# Patient Record
Sex: MALE | Race: BLACK OR AFRICAN AMERICAN | NOT HISPANIC OR LATINO
[De-identification: names, ages, dates, MRNs, and addresses within clinical notes are randomized per-mention and may not be internally consistent; named-entity substitution may affect disease eponyms.]

---

## 2016-12-27 RX ORDER — GABAPENTIN 400 MG/1
100 CAPSULE ORAL ONCE
Qty: 0 | Refills: 0 | Status: COMPLETED | OUTPATIENT
Start: 2017-01-07 | End: 2017-01-07

## 2016-12-27 RX ORDER — VANCOMYCIN HCL 1 G
1500 VIAL (EA) INTRAVENOUS ONCE
Qty: 0 | Refills: 0 | Status: DISCONTINUED | OUTPATIENT
Start: 2017-01-07 | End: 2017-01-07

## 2016-12-27 RX ORDER — SODIUM CHLORIDE 9 MG/ML
3 INJECTION INTRAMUSCULAR; INTRAVENOUS; SUBCUTANEOUS EVERY 8 HOURS
Qty: 0 | Refills: 0 | Status: DISCONTINUED | OUTPATIENT
Start: 2017-01-07 | End: 2017-01-09

## 2016-12-27 RX ORDER — ACETAMINOPHEN 500 MG
975 TABLET ORAL ONCE
Qty: 0 | Refills: 0 | Status: COMPLETED | OUTPATIENT
Start: 2017-01-07 | End: 2017-01-07

## 2016-12-27 RX ORDER — TRAMADOL HYDROCHLORIDE 50 MG/1
50 TABLET ORAL ONCE
Qty: 0 | Refills: 0 | Status: DISCONTINUED | OUTPATIENT
Start: 2017-01-07 | End: 2017-01-07

## 2016-12-27 RX ORDER — PANTOPRAZOLE SODIUM 20 MG/1
40 TABLET, DELAYED RELEASE ORAL ONCE
Qty: 0 | Refills: 0 | Status: COMPLETED | OUTPATIENT
Start: 2017-01-07 | End: 2017-01-07

## 2017-01-06 ENCOUNTER — RESULT REVIEW (OUTPATIENT)
Age: 72
End: 2017-01-06

## 2017-01-07 ENCOUNTER — INPATIENT (INPATIENT)
Facility: HOSPITAL | Age: 72
LOS: 1 days | Discharge: HOME | DRG: 470 | End: 2017-01-09
Attending: ORTHOPAEDIC SURGERY | Admitting: ORTHOPAEDIC SURGERY
Payer: MEDICARE

## 2017-01-07 ENCOUNTER — APPOINTMENT (OUTPATIENT)
Dept: ORTHOPEDIC SURGERY | Facility: HOSPITAL | Age: 72
End: 2017-01-07

## 2017-01-07 VITALS
WEIGHT: 207.9 LBS | OXYGEN SATURATION: 99 % | HEART RATE: 60 BPM | DIASTOLIC BLOOD PRESSURE: 64 MMHG | HEIGHT: 68 IN | SYSTOLIC BLOOD PRESSURE: 132 MMHG | TEMPERATURE: 99 F | RESPIRATION RATE: 18 BRPM

## 2017-01-07 DIAGNOSIS — Z90.79 ACQUIRED ABSENCE OF OTHER GENITAL ORGAN(S): Chronic | ICD-10-CM

## 2017-01-07 DIAGNOSIS — Z96.641 PRESENCE OF RIGHT ARTIFICIAL HIP JOINT: Chronic | ICD-10-CM

## 2017-01-07 DIAGNOSIS — M16.12 UNILATERAL PRIMARY OSTEOARTHRITIS, LEFT HIP: ICD-10-CM

## 2017-01-07 DIAGNOSIS — Z98.49 CATARACT EXTRACTION STATUS, UNSPECIFIED EYE: Chronic | ICD-10-CM

## 2017-01-07 LAB
ANION GAP SERPL CALC-SCNC: 12 MMOL/L — SIGNIFICANT CHANGE UP (ref 5–17)
BUN SERPL-MCNC: 24 MG/DL — HIGH (ref 7–23)
CALCIUM SERPL-MCNC: 9.5 MG/DL — SIGNIFICANT CHANGE UP (ref 8.4–10.5)
CHLORIDE SERPL-SCNC: 105 MMOL/L — SIGNIFICANT CHANGE UP (ref 96–108)
CO2 SERPL-SCNC: 22 MMOL/L — SIGNIFICANT CHANGE UP (ref 22–31)
CREAT SERPL-MCNC: 1.38 MG/DL — HIGH (ref 0.5–1.3)
GLUCOSE SERPL-MCNC: 113 MG/DL — HIGH (ref 70–99)
HCT VFR BLD CALC: 38.6 % — LOW (ref 39–50)
HGB BLD-MCNC: 12.9 G/DL — LOW (ref 13–17)
MCHC RBC-ENTMCNC: 29.1 PG — SIGNIFICANT CHANGE UP (ref 27–34)
MCHC RBC-ENTMCNC: 33.6 GM/DL — SIGNIFICANT CHANGE UP (ref 32–36)
MCV RBC AUTO: 86.6 FL — SIGNIFICANT CHANGE UP (ref 80–100)
PLATELET # BLD AUTO: 265 K/UL — SIGNIFICANT CHANGE UP (ref 150–400)
POTASSIUM SERPL-MCNC: 4.9 MMOL/L — SIGNIFICANT CHANGE UP (ref 3.5–5.3)
POTASSIUM SERPL-SCNC: 4.9 MMOL/L — SIGNIFICANT CHANGE UP (ref 3.5–5.3)
RBC # BLD: 4.46 M/UL — SIGNIFICANT CHANGE UP (ref 4.2–5.8)
RBC # FLD: 15.1 % — HIGH (ref 10.3–14.5)
SODIUM SERPL-SCNC: 139 MMOL/L — SIGNIFICANT CHANGE UP (ref 135–145)
WBC # BLD: 10.1 K/UL — SIGNIFICANT CHANGE UP (ref 3.8–10.5)
WBC # FLD AUTO: 10.1 K/UL — SIGNIFICANT CHANGE UP (ref 3.8–10.5)

## 2017-01-07 PROCEDURE — 88305 TISSUE EXAM BY PATHOLOGIST: CPT | Mod: 26

## 2017-01-07 PROCEDURE — 72170 X-RAY EXAM OF PELVIS: CPT | Mod: 26

## 2017-01-07 PROCEDURE — 27130 TOTAL HIP ARTHROPLASTY: CPT | Mod: LT

## 2017-01-07 PROCEDURE — 27130 TOTAL HIP ARTHROPLASTY: CPT | Mod: AS,LT

## 2017-01-07 PROCEDURE — 88311 DECALCIFY TISSUE: CPT | Mod: 26

## 2017-01-07 RX ORDER — OXYCODONE HYDROCHLORIDE 5 MG/1
10 TABLET ORAL EVERY 4 HOURS
Qty: 0 | Refills: 0 | Status: DISCONTINUED | OUTPATIENT
Start: 2017-01-07 | End: 2017-01-09

## 2017-01-07 RX ORDER — PANTOPRAZOLE SODIUM 20 MG/1
40 TABLET, DELAYED RELEASE ORAL DAILY
Qty: 0 | Refills: 0 | Status: DISCONTINUED | OUTPATIENT
Start: 2017-01-07 | End: 2017-01-09

## 2017-01-07 RX ORDER — ATORVASTATIN CALCIUM 80 MG/1
10 TABLET, FILM COATED ORAL AT BEDTIME
Qty: 0 | Refills: 0 | Status: DISCONTINUED | OUTPATIENT
Start: 2017-01-07 | End: 2017-01-09

## 2017-01-07 RX ORDER — ACETAMINOPHEN 500 MG
650 TABLET ORAL EVERY 6 HOURS
Qty: 0 | Refills: 0 | Status: DISCONTINUED | OUTPATIENT
Start: 2017-01-07 | End: 2017-01-09

## 2017-01-07 RX ORDER — HYDROMORPHONE HYDROCHLORIDE 2 MG/ML
0.5 INJECTION INTRAMUSCULAR; INTRAVENOUS; SUBCUTANEOUS
Qty: 0 | Refills: 0 | Status: DISCONTINUED | OUTPATIENT
Start: 2017-01-07 | End: 2017-01-07

## 2017-01-07 RX ORDER — MORPHINE SULFATE 50 MG/1
2 CAPSULE, EXTENDED RELEASE ORAL
Qty: 0 | Refills: 0 | Status: DISCONTINUED | OUTPATIENT
Start: 2017-01-07 | End: 2017-01-09

## 2017-01-07 RX ORDER — KETOROLAC TROMETHAMINE 30 MG/ML
15 SYRINGE (ML) INJECTION EVERY 8 HOURS
Qty: 0 | Refills: 0 | Status: DISCONTINUED | OUTPATIENT
Start: 2017-01-07 | End: 2017-01-09

## 2017-01-07 RX ORDER — MAGNESIUM HYDROXIDE 400 MG/1
30 TABLET, CHEWABLE ORAL DAILY
Qty: 0 | Refills: 0 | Status: DISCONTINUED | OUTPATIENT
Start: 2017-01-07 | End: 2017-01-09

## 2017-01-07 RX ORDER — CELECOXIB 200 MG/1
200 CAPSULE ORAL
Qty: 0 | Refills: 0 | Status: DISCONTINUED | OUTPATIENT
Start: 2017-01-07 | End: 2017-01-07

## 2017-01-07 RX ORDER — SENNA PLUS 8.6 MG/1
2 TABLET ORAL AT BEDTIME
Qty: 0 | Refills: 0 | Status: DISCONTINUED | OUTPATIENT
Start: 2017-01-07 | End: 2017-01-09

## 2017-01-07 RX ORDER — LISINOPRIL 2.5 MG/1
20 TABLET ORAL DAILY
Qty: 0 | Refills: 0 | Status: DISCONTINUED | OUTPATIENT
Start: 2017-01-07 | End: 2017-01-09

## 2017-01-07 RX ORDER — VANCOMYCIN HCL 1 G
1000 VIAL (EA) INTRAVENOUS ONCE
Qty: 0 | Refills: 0 | Status: COMPLETED | OUTPATIENT
Start: 2017-01-07 | End: 2017-01-07

## 2017-01-07 RX ORDER — AMLODIPINE BESYLATE 2.5 MG/1
5 TABLET ORAL
Qty: 0 | Refills: 0 | Status: DISCONTINUED | OUTPATIENT
Start: 2017-01-07 | End: 2017-01-07

## 2017-01-07 RX ORDER — ASPIRIN/CALCIUM CARB/MAGNESIUM 324 MG
325 TABLET ORAL
Qty: 0 | Refills: 0 | Status: DISCONTINUED | OUTPATIENT
Start: 2017-01-07 | End: 2017-01-09

## 2017-01-07 RX ORDER — SODIUM CHLORIDE 9 MG/ML
1000 INJECTION, SOLUTION INTRAVENOUS
Qty: 0 | Refills: 0 | Status: DISCONTINUED | OUTPATIENT
Start: 2017-01-07 | End: 2017-01-09

## 2017-01-07 RX ORDER — ONDANSETRON 8 MG/1
4 TABLET, FILM COATED ORAL EVERY 6 HOURS
Qty: 0 | Refills: 0 | Status: DISCONTINUED | OUTPATIENT
Start: 2017-01-07 | End: 2017-01-09

## 2017-01-07 RX ORDER — SPIRONOLACTONE 25 MG/1
12.5 TABLET, FILM COATED ORAL EVERY OTHER DAY
Qty: 0 | Refills: 0 | Status: DISCONTINUED | OUTPATIENT
Start: 2017-01-07 | End: 2017-01-09

## 2017-01-07 RX ORDER — LABETALOL HCL 100 MG
100 TABLET ORAL
Qty: 0 | Refills: 0 | Status: DISCONTINUED | OUTPATIENT
Start: 2017-01-07 | End: 2017-01-09

## 2017-01-07 RX ORDER — LISINOPRIL 2.5 MG/1
20 TABLET ORAL
Qty: 0 | Refills: 0 | Status: DISCONTINUED | OUTPATIENT
Start: 2017-01-07 | End: 2017-01-07

## 2017-01-07 RX ORDER — AMLODIPINE BESYLATE 2.5 MG/1
5 TABLET ORAL DAILY
Qty: 0 | Refills: 0 | Status: DISCONTINUED | OUTPATIENT
Start: 2017-01-07 | End: 2017-01-09

## 2017-01-07 RX ORDER — CHOLECALCIFEROL (VITAMIN D3) 125 MCG
1000 CAPSULE ORAL DAILY
Qty: 0 | Refills: 0 | Status: DISCONTINUED | OUTPATIENT
Start: 2017-01-07 | End: 2017-01-09

## 2017-01-07 RX ORDER — SODIUM CHLORIDE 9 MG/ML
1000 INJECTION, SOLUTION INTRAVENOUS ONCE
Qty: 0 | Refills: 0 | Status: COMPLETED | OUTPATIENT
Start: 2017-01-07 | End: 2017-01-07

## 2017-01-07 RX ORDER — DOCUSATE SODIUM 100 MG
100 CAPSULE ORAL THREE TIMES A DAY
Qty: 0 | Refills: 0 | Status: DISCONTINUED | OUTPATIENT
Start: 2017-01-07 | End: 2017-01-09

## 2017-01-07 RX ORDER — DEXAMETHASONE 0.5 MG/5ML
10 ELIXIR ORAL ONCE
Qty: 0 | Refills: 0 | Status: COMPLETED | OUTPATIENT
Start: 2017-01-08 | End: 2017-01-08

## 2017-01-07 RX ORDER — OXYCODONE HYDROCHLORIDE 5 MG/1
5 TABLET ORAL EVERY 4 HOURS
Qty: 0 | Refills: 0 | Status: DISCONTINUED | OUTPATIENT
Start: 2017-01-07 | End: 2017-01-09

## 2017-01-07 RX ORDER — POLYETHYLENE GLYCOL 3350 17 G/17G
17 POWDER, FOR SOLUTION ORAL DAILY
Qty: 0 | Refills: 0 | Status: DISCONTINUED | OUTPATIENT
Start: 2017-01-07 | End: 2017-01-09

## 2017-01-07 RX ORDER — TRAMADOL HYDROCHLORIDE 50 MG/1
25 TABLET ORAL EVERY 8 HOURS
Qty: 0 | Refills: 0 | Status: DISCONTINUED | OUTPATIENT
Start: 2017-01-07 | End: 2017-01-09

## 2017-01-07 RX ORDER — DIPHENHYDRAMINE HCL 50 MG
25 CAPSULE ORAL EVERY 4 HOURS
Qty: 0 | Refills: 0 | Status: DISCONTINUED | OUTPATIENT
Start: 2017-01-07 | End: 2017-01-09

## 2017-01-07 RX ADMIN — Medication 975 MILLIGRAM(S): at 07:14

## 2017-01-07 RX ADMIN — TRAMADOL HYDROCHLORIDE 25 MILLIGRAM(S): 50 TABLET ORAL at 15:30

## 2017-01-07 RX ADMIN — Medication 100 MILLIGRAM(S): at 18:12

## 2017-01-07 RX ADMIN — Medication 100 MILLIGRAM(S): at 22:44

## 2017-01-07 RX ADMIN — Medication 1000 UNIT(S): at 18:12

## 2017-01-07 RX ADMIN — POLYETHYLENE GLYCOL 3350 17 GRAM(S): 17 POWDER, FOR SOLUTION ORAL at 18:12

## 2017-01-07 RX ADMIN — PANTOPRAZOLE SODIUM 40 MILLIGRAM(S): 20 TABLET, DELAYED RELEASE ORAL at 18:12

## 2017-01-07 RX ADMIN — GABAPENTIN 100 MILLIGRAM(S): 400 CAPSULE ORAL at 07:14

## 2017-01-07 RX ADMIN — TRAMADOL HYDROCHLORIDE 50 MILLIGRAM(S): 50 TABLET ORAL at 07:51

## 2017-01-07 RX ADMIN — ATORVASTATIN CALCIUM 10 MILLIGRAM(S): 80 TABLET, FILM COATED ORAL at 22:44

## 2017-01-07 RX ADMIN — Medication 325 MILLIGRAM(S): at 18:12

## 2017-01-07 RX ADMIN — TRAMADOL HYDROCHLORIDE 25 MILLIGRAM(S): 50 TABLET ORAL at 22:44

## 2017-01-07 RX ADMIN — SODIUM CHLORIDE 3 MILLILITER(S): 9 INJECTION INTRAMUSCULAR; INTRAVENOUS; SUBCUTANEOUS at 07:14

## 2017-01-07 RX ADMIN — Medication 250 MILLIGRAM(S): at 20:03

## 2017-01-07 RX ADMIN — SODIUM CHLORIDE 3 MILLILITER(S): 9 INJECTION INTRAMUSCULAR; INTRAVENOUS; SUBCUTANEOUS at 22:43

## 2017-01-07 RX ADMIN — PANTOPRAZOLE SODIUM 40 MILLIGRAM(S): 20 TABLET, DELAYED RELEASE ORAL at 07:14

## 2017-01-07 RX ADMIN — TRAMADOL HYDROCHLORIDE 25 MILLIGRAM(S): 50 TABLET ORAL at 23:14

## 2017-01-07 RX ADMIN — TRAMADOL HYDROCHLORIDE 25 MILLIGRAM(S): 50 TABLET ORAL at 14:56

## 2017-01-07 RX ADMIN — SODIUM CHLORIDE 60 MILLILITER(S): 9 INJECTION, SOLUTION INTRAVENOUS at 12:00

## 2017-01-07 NOTE — PATIENT PROFILE ADULT. - PSH
History of cataract extraction  and detached retina surgery- right eyes  S/P hip replacement, right  10/8/16  S/P TURP (transurethral resection of prostate)

## 2017-01-07 NOTE — PRE-OP CHECKLIST - 1.
emotional support provided to patient and family, pre op instruction and orientation to procedure provided to patient and family

## 2017-01-07 NOTE — PATIENT PROFILE ADULT. - PMH
Aortic insufficiency  followed by cardiologist, last echo 11/2016  History of gout    HTN (hypertension)    Hyperlipidemia    Osteoarthritis of hip

## 2017-01-08 LAB
ANION GAP SERPL CALC-SCNC: 15 MMOL/L — SIGNIFICANT CHANGE UP (ref 5–17)
BUN SERPL-MCNC: 21 MG/DL — SIGNIFICANT CHANGE UP (ref 7–23)
CALCIUM SERPL-MCNC: 9.5 MG/DL — SIGNIFICANT CHANGE UP (ref 8.4–10.5)
CHLORIDE SERPL-SCNC: 98 MMOL/L — SIGNIFICANT CHANGE UP (ref 96–108)
CO2 SERPL-SCNC: 22 MMOL/L — SIGNIFICANT CHANGE UP (ref 22–31)
CREAT SERPL-MCNC: 1.23 MG/DL — SIGNIFICANT CHANGE UP (ref 0.5–1.3)
GLUCOSE SERPL-MCNC: 117 MG/DL — HIGH (ref 70–99)
HCT VFR BLD CALC: 33.3 % — LOW (ref 39–50)
HGB BLD-MCNC: 11.2 G/DL — LOW (ref 13–17)
MCHC RBC-ENTMCNC: 27.5 PG — SIGNIFICANT CHANGE UP (ref 27–34)
MCHC RBC-ENTMCNC: 33.6 GM/DL — SIGNIFICANT CHANGE UP (ref 32–36)
MCV RBC AUTO: 81.8 FL — SIGNIFICANT CHANGE UP (ref 80–100)
PLATELET # BLD AUTO: 266 K/UL — SIGNIFICANT CHANGE UP (ref 150–400)
POTASSIUM SERPL-MCNC: 4.7 MMOL/L — SIGNIFICANT CHANGE UP (ref 3.5–5.3)
POTASSIUM SERPL-SCNC: 4.7 MMOL/L — SIGNIFICANT CHANGE UP (ref 3.5–5.3)
RBC # BLD: 4.07 M/UL — LOW (ref 4.2–5.8)
RBC # FLD: 15.1 % — HIGH (ref 10.3–14.5)
SODIUM SERPL-SCNC: 135 MMOL/L — SIGNIFICANT CHANGE UP (ref 135–145)
WBC # BLD: 10.46 K/UL — SIGNIFICANT CHANGE UP (ref 3.8–10.5)
WBC # FLD AUTO: 10.46 K/UL — SIGNIFICANT CHANGE UP (ref 3.8–10.5)

## 2017-01-08 RX ADMIN — SODIUM CHLORIDE 3 MILLILITER(S): 9 INJECTION INTRAMUSCULAR; INTRAVENOUS; SUBCUTANEOUS at 21:39

## 2017-01-08 RX ADMIN — PANTOPRAZOLE SODIUM 40 MILLIGRAM(S): 20 TABLET, DELAYED RELEASE ORAL at 12:47

## 2017-01-08 RX ADMIN — OXYCODONE HYDROCHLORIDE 5 MILLIGRAM(S): 5 TABLET ORAL at 08:50

## 2017-01-08 RX ADMIN — SODIUM CHLORIDE 1000 MILLILITER(S): 9 INJECTION, SOLUTION INTRAVENOUS at 06:50

## 2017-01-08 RX ADMIN — LISINOPRIL 20 MILLIGRAM(S): 2.5 TABLET ORAL at 06:25

## 2017-01-08 RX ADMIN — Medication 10 MILLIGRAM(S): at 06:35

## 2017-01-08 RX ADMIN — SODIUM CHLORIDE 3 MILLILITER(S): 9 INJECTION INTRAMUSCULAR; INTRAVENOUS; SUBCUTANEOUS at 13:58

## 2017-01-08 RX ADMIN — AMLODIPINE BESYLATE 5 MILLIGRAM(S): 2.5 TABLET ORAL at 06:25

## 2017-01-08 RX ADMIN — TRAMADOL HYDROCHLORIDE 25 MILLIGRAM(S): 50 TABLET ORAL at 06:25

## 2017-01-08 RX ADMIN — Medication 100 MILLIGRAM(S): at 13:59

## 2017-01-08 RX ADMIN — TRAMADOL HYDROCHLORIDE 25 MILLIGRAM(S): 50 TABLET ORAL at 13:59

## 2017-01-08 RX ADMIN — TRAMADOL HYDROCHLORIDE 25 MILLIGRAM(S): 50 TABLET ORAL at 21:39

## 2017-01-08 RX ADMIN — OXYCODONE HYDROCHLORIDE 5 MILLIGRAM(S): 5 TABLET ORAL at 08:18

## 2017-01-08 RX ADMIN — OXYCODONE HYDROCHLORIDE 5 MILLIGRAM(S): 5 TABLET ORAL at 13:16

## 2017-01-08 RX ADMIN — POLYETHYLENE GLYCOL 3350 17 GRAM(S): 17 POWDER, FOR SOLUTION ORAL at 12:47

## 2017-01-08 RX ADMIN — Medication 100 MILLIGRAM(S): at 06:25

## 2017-01-08 RX ADMIN — SODIUM CHLORIDE 3 MILLILITER(S): 9 INJECTION INTRAMUSCULAR; INTRAVENOUS; SUBCUTANEOUS at 06:24

## 2017-01-08 RX ADMIN — ATORVASTATIN CALCIUM 10 MILLIGRAM(S): 80 TABLET, FILM COATED ORAL at 21:39

## 2017-01-08 RX ADMIN — Medication 1000 UNIT(S): at 12:47

## 2017-01-08 RX ADMIN — TRAMADOL HYDROCHLORIDE 25 MILLIGRAM(S): 50 TABLET ORAL at 07:00

## 2017-01-08 RX ADMIN — OXYCODONE HYDROCHLORIDE 5 MILLIGRAM(S): 5 TABLET ORAL at 12:46

## 2017-01-08 RX ADMIN — Medication 325 MILLIGRAM(S): at 17:47

## 2017-01-08 RX ADMIN — Medication 325 MILLIGRAM(S): at 06:25

## 2017-01-08 RX ADMIN — Medication 100 MILLIGRAM(S): at 21:39

## 2017-01-08 RX ADMIN — TRAMADOL HYDROCHLORIDE 25 MILLIGRAM(S): 50 TABLET ORAL at 22:00

## 2017-01-08 RX ADMIN — TRAMADOL HYDROCHLORIDE 25 MILLIGRAM(S): 50 TABLET ORAL at 14:30

## 2017-01-08 NOTE — OCCUPATIONAL THERAPY INITIAL EVALUATION ADULT - ADDITIONAL COMMENTS
XRay pelvis 1/7/2017:  There has been interval placement of a left total hip arthroplasty with noncemented components. There is cystic change in the left the acetabulum superolaterally and inferior medially on this baseline postoperative film. Adjacent soft tissue swelling and air is consistent with recent operative change. Air-filled loops of small bowel are nonspecific. Correlate for any abdominal pain.

## 2017-01-08 NOTE — OCCUPATIONAL THERAPY INITIAL EVALUATION ADULT - PLANNED THERAPY INTERVENTIONS, OT EVAL
transfer training/ADL retraining/balance training/IADL retraining/bed mobility training/strengthening

## 2017-01-08 NOTE — PHYSICAL THERAPY INITIAL EVALUATION ADULT - GENERAL OBSERVATIONS, REHAB EVAL
Pt received semi-supine in bed +IV lock, b/l venodynes, abduction pillow in place, pre-medicated by DIANNE Stern, motivated to work with PT

## 2017-01-08 NOTE — PHYSICAL THERAPY INITIAL EVALUATION ADULT - MANUAL MUSCLE TESTING RESULTS, REHAB EVAL
BUE 5/5 throughout; RLE at leat 3/5 throughout; LLE at least 3/5 throughout (not formally tested secondary to post-op pain)

## 2017-01-08 NOTE — PHYSICAL THERAPY INITIAL EVALUATION ADULT - ADDITIONAL COMMENTS
(cont from above):XRayPelvis:There has been interval placement of a L total hip arthroplasty with noncemented components. There is cystic change in the L the acetabulum superolaterally and inferior medially on this baseline postoperative film. Adjacent soft tissue swelling and air is consistent with recent operative change. Air-filled loops of small bowel are nonspecific. Correlate for any abdominal pain. (cont from above):XRayPelvis:There has been interval placement of a L total hip arthroplasty with noncemented components. There is cystic change in the L the acetabulum superolaterally and inferior medially on this baseline postoperative film. Adjacent soft tissue swelling and air is consistent with recent operative change. Air-filled loops of small bowel are nonspecific. Correlate for any abdominal pain.    Pt lives in private house with 4+4 ARTHUR w/unilateral rail. Has entrance from driveway with 3 ARTHUR however no rail. Pt has full flight to upstairs bedroom but states he has set up bed on first level.

## 2017-01-08 NOTE — OCCUPATIONAL THERAPY INITIAL EVALUATION ADULT - PERTINENT HX OF CURRENT PROBLEM, REHAB EVAL
72 y.o. male with HTN, HLD, chronic aortic insufficiency  and osteoarthritis of both hips, s/p right THR 10/8/16, presents to PST for scheduled left THR on 1/7/16. Accompanied by wife.

## 2017-01-08 NOTE — OCCUPATIONAL THERAPY INITIAL EVALUATION ADULT - MD ORDER
Ot Evaluate and Treat  s/p L THR anterior approach   WBAT-LLE OT Evaluate and Treat  s/p L THR anterior approach   WBAT-LLE

## 2017-01-08 NOTE — PHYSICAL THERAPY INITIAL EVALUATION ADULT - PERTINENT HX OF CURRENT PROBLEM, REHAB EVAL
71y/o M with HTN, HLD, chronic aortic insufficiency  and osteoarthritis of both hips, s/p right THR 10/8/16, presents to PST for scheduled left THR on 1/7/16 (cont below)

## 2017-01-08 NOTE — OCCUPATIONAL THERAPY INITIAL EVALUATION ADULT - LIVES WITH, PROFILE
spouse/Pt lives with wife in 2 story private home +8 steps to enter with b/l handrails. Pt states bedroom/bathroom located on main level. +walk in shower with 2 grab bars and handheld shower

## 2017-01-09 ENCOUNTER — TRANSCRIPTION ENCOUNTER (OUTPATIENT)
Age: 72
End: 2017-01-09

## 2017-01-09 VITALS
HEART RATE: 63 BPM | SYSTOLIC BLOOD PRESSURE: 109 MMHG | OXYGEN SATURATION: 97 % | TEMPERATURE: 98 F | DIASTOLIC BLOOD PRESSURE: 70 MMHG | RESPIRATION RATE: 18 BRPM

## 2017-01-09 LAB
ANION GAP SERPL CALC-SCNC: 11 MMOL/L — SIGNIFICANT CHANGE UP (ref 5–17)
BUN SERPL-MCNC: 27 MG/DL — HIGH (ref 7–23)
CALCIUM SERPL-MCNC: 9.4 MG/DL — SIGNIFICANT CHANGE UP (ref 8.4–10.5)
CHLORIDE SERPL-SCNC: 102 MMOL/L — SIGNIFICANT CHANGE UP (ref 96–108)
CO2 SERPL-SCNC: 23 MMOL/L — SIGNIFICANT CHANGE UP (ref 22–31)
CREAT SERPL-MCNC: 1.32 MG/DL — HIGH (ref 0.5–1.3)
GLUCOSE SERPL-MCNC: 105 MG/DL — HIGH (ref 70–99)
HCT VFR BLD CALC: 30.6 % — LOW (ref 39–50)
HGB BLD-MCNC: 10.6 G/DL — LOW (ref 13–17)
MCHC RBC-ENTMCNC: 27.5 PG — SIGNIFICANT CHANGE UP (ref 27–34)
MCHC RBC-ENTMCNC: 34.6 GM/DL — SIGNIFICANT CHANGE UP (ref 32–36)
MCV RBC AUTO: 79.5 FL — LOW (ref 80–100)
PLATELET # BLD AUTO: 234 K/UL — SIGNIFICANT CHANGE UP (ref 150–400)
POTASSIUM SERPL-MCNC: 4.4 MMOL/L — SIGNIFICANT CHANGE UP (ref 3.5–5.3)
POTASSIUM SERPL-SCNC: 4.4 MMOL/L — SIGNIFICANT CHANGE UP (ref 3.5–5.3)
RBC # BLD: 3.85 M/UL — LOW (ref 4.2–5.8)
RBC # FLD: 15.3 % — HIGH (ref 10.3–14.5)
SODIUM SERPL-SCNC: 136 MMOL/L — SIGNIFICANT CHANGE UP (ref 135–145)
WBC # BLD: 10.67 K/UL — HIGH (ref 3.8–10.5)
WBC # FLD AUTO: 10.67 K/UL — HIGH (ref 3.8–10.5)

## 2017-01-09 PROCEDURE — 88311 DECALCIFY TISSUE: CPT

## 2017-01-09 PROCEDURE — 76000 FLUOROSCOPY <1 HR PHYS/QHP: CPT

## 2017-01-09 PROCEDURE — 97116 GAIT TRAINING THERAPY: CPT

## 2017-01-09 PROCEDURE — C1889: CPT

## 2017-01-09 PROCEDURE — 97535 SELF CARE MNGMENT TRAINING: CPT

## 2017-01-09 PROCEDURE — 72170 X-RAY EXAM OF PELVIS: CPT

## 2017-01-09 PROCEDURE — C1776: CPT

## 2017-01-09 PROCEDURE — C1713: CPT

## 2017-01-09 PROCEDURE — 97165 OT EVAL LOW COMPLEX 30 MIN: CPT

## 2017-01-09 PROCEDURE — 97161 PT EVAL LOW COMPLEX 20 MIN: CPT

## 2017-01-09 PROCEDURE — 85027 COMPLETE CBC AUTOMATED: CPT

## 2017-01-09 PROCEDURE — 88305 TISSUE EXAM BY PATHOLOGIST: CPT

## 2017-01-09 PROCEDURE — 97530 THERAPEUTIC ACTIVITIES: CPT

## 2017-01-09 PROCEDURE — 80048 BASIC METABOLIC PNL TOTAL CA: CPT

## 2017-01-09 RX ORDER — SENNA PLUS 8.6 MG/1
2 TABLET ORAL
Qty: 0 | Refills: 0 | DISCHARGE
Start: 2017-01-09

## 2017-01-09 RX ORDER — DOCUSATE SODIUM 100 MG
1 CAPSULE ORAL
Qty: 0 | Refills: 0 | DISCHARGE
Start: 2017-01-09

## 2017-01-09 RX ORDER — TRAMADOL HYDROCHLORIDE 50 MG/1
0.5 TABLET ORAL
Qty: 0 | Refills: 0 | DISCHARGE
Start: 2017-01-09

## 2017-01-09 RX ORDER — ASPIRIN/CALCIUM CARB/MAGNESIUM 324 MG
1 TABLET ORAL
Qty: 0 | Refills: 0 | DISCHARGE
Start: 2017-01-09

## 2017-01-09 RX ORDER — OXYCODONE HYDROCHLORIDE 5 MG/1
1 TABLET ORAL
Qty: 0 | Refills: 0 | COMMUNITY
Start: 2017-01-09

## 2017-01-09 RX ORDER — OXYCODONE HYDROCHLORIDE 5 MG/1
1 TABLET ORAL
Qty: 55 | Refills: 0
Start: 2017-01-09

## 2017-01-09 RX ORDER — MELOXICAM 15 MG/1
1 TABLET ORAL
Qty: 14 | Refills: 0
Start: 2017-01-09 | End: 2017-01-23

## 2017-01-09 RX ORDER — ACETAMINOPHEN 500 MG
2 TABLET ORAL
Qty: 0 | Refills: 0 | DISCHARGE
Start: 2017-01-09

## 2017-01-09 RX ORDER — TRAMADOL HYDROCHLORIDE 50 MG/1
1 TABLET ORAL
Qty: 30 | Refills: 0
Start: 2017-01-09

## 2017-01-09 RX ORDER — POLYETHYLENE GLYCOL 3350 17 G/17G
17 POWDER, FOR SOLUTION ORAL
Qty: 0 | Refills: 0 | DISCHARGE
Start: 2017-01-09

## 2017-01-09 RX ADMIN — Medication 1 TABLET(S): at 12:13

## 2017-01-09 RX ADMIN — SODIUM CHLORIDE 3 MILLILITER(S): 9 INJECTION INTRAMUSCULAR; INTRAVENOUS; SUBCUTANEOUS at 05:10

## 2017-01-09 RX ADMIN — Medication 100 MILLIGRAM(S): at 05:07

## 2017-01-09 RX ADMIN — TRAMADOL HYDROCHLORIDE 25 MILLIGRAM(S): 50 TABLET ORAL at 05:07

## 2017-01-09 RX ADMIN — SPIRONOLACTONE 12.5 MILLIGRAM(S): 25 TABLET, FILM COATED ORAL at 12:13

## 2017-01-09 RX ADMIN — AMLODIPINE BESYLATE 5 MILLIGRAM(S): 2.5 TABLET ORAL at 05:07

## 2017-01-09 RX ADMIN — PANTOPRAZOLE SODIUM 40 MILLIGRAM(S): 20 TABLET, DELAYED RELEASE ORAL at 12:14

## 2017-01-09 RX ADMIN — Medication 1000 UNIT(S): at 12:14

## 2017-01-09 RX ADMIN — POLYETHYLENE GLYCOL 3350 17 GRAM(S): 17 POWDER, FOR SOLUTION ORAL at 12:13

## 2017-01-09 RX ADMIN — LISINOPRIL 20 MILLIGRAM(S): 2.5 TABLET ORAL at 05:07

## 2017-01-09 RX ADMIN — Medication 325 MILLIGRAM(S): at 05:07

## 2017-01-09 RX ADMIN — TRAMADOL HYDROCHLORIDE 25 MILLIGRAM(S): 50 TABLET ORAL at 05:40

## 2017-01-09 NOTE — DISCHARGE NOTE ADULT - PLAN OF CARE
improved ambulation and pain control Weight bearing as tolerated  (Anterior precautions)   Please drop and dangle leg Q8hr for 45 min at a time, ankle pumps, quad sets, gluteal clenches and leg lifts   see discharge instructions

## 2017-01-09 NOTE — DISCHARGE NOTE ADULT - CARE PROVIDERS DIRECT ADDRESSES
[email or web address]

## 2017-01-09 NOTE — DISCHARGE NOTE ADULT - CARE PLAN
Principal Discharge DX:	Primary osteoarthritis of left hip  Goal:	improved ambulation and pain control  Instructions for follow-up, activity and diet:	Weight bearing as tolerated  (Anterior precautions)   Please drop and dangle leg Q8hr for 45 min at a time, ankle pumps, quad sets, gluteal clenches and leg lifts   see discharge instructions

## 2017-01-09 NOTE — DISCHARGE NOTE ADULT - PATIENT PORTAL LINK FT
“You can access the [facility] Patient Portal, offered by [facility], by registering with the following website: [email or web address]”

## 2017-01-09 NOTE — DISCHARGE NOTE ADULT - MEDICATION SUMMARY - MEDICATIONS TO TAKE
I will START or STAY ON the medications listed below when I get home from the hospital:    spironolactone 25 mg oral tablet  -- 0.5 tab(s) by mouth every other day  -- Indication: For blood pressure    acetaminophen 325 mg oral tablet  -- 2 tab(s) by mouth every 6 hours, As needed, For Temp over 38.3 C (100.94 F)  -- Indication: For Pain / fever    traMADol 50 mg oral tablet  -- 0.5 tab(s) by mouth every 8 hours  -- Indication: For Pain    aspirin 325 mg oral delayed release tablet  -- 1 tab(s) by mouth 2 times a day x 6 WEEKS Total (blood thinner) then resume Baby Aspirin (81mg)  daily  -- Indication: For blood thinner    traMADol 50 mg oral tablet  -- 1 tab(s) by mouth every 8-12  hours MDD:3  -- Indication: For Pain    oxyCODONE 5 mg oral tablet  -- 1-2  tab(s) by mouth every 6 hours as needed MDD:8  -- Indication: For Pain    Mobic 7.5 mg oral tablet  -- 1 tab(s) by mouth once a day  -- Do not take this drug if you are pregnant.  Obtain medical advice before taking any non-prescription drugs as some may affect the action of this medication.  Take with food or milk.    -- Indication: For Pain    pravastatin 40 mg oral tablet  -- 1 tab(s) by mouth once a day  -- Indication: For Hyperlipidemia    amLODIPine-benazepril 5 mg-20 mg oral capsule  -- 1 cap(s) by mouth 2 times a day  -- Indication: For blood pressure    labetalol 100 mg oral tablet  -- 1 tab(s) by mouth 2 times a day  -- Indication: For blood pressure    polyethylene glycol 3350 oral powder for reconstitution  -- 17 gram(s) by mouth once a day  -- while on pain medications   -- Indication: For laxative    docusate sodium 100 mg oral capsule  -- 1 cap(s) by mouth 3 times a day  -- while on pain medications   -- Indication: For Stool softener    senna oral tablet  -- 2 tab(s) by mouth once a day (at bedtime), As needed, Constipation  -- Indication: For laxative    Multiple Vitamins oral tablet  -- 1 tab(s) by mouth once a day  -- Indication: For Supplement

## 2017-01-09 NOTE — DISCHARGE NOTE ADULT - NS AS ACTIVITY OBS
Do not drive or operate machinery/Showering allowed/Stairs allowed/No Heavy lifting/straining/Walking-Outdoors allowed/May shower; protect Aquacel dressing with water-tight seal  i.e. saran wrap; pat dry/Walking-Indoors allowed

## 2017-01-09 NOTE — DISCHARGE NOTE ADULT - MEDICATION SUMMARY - MEDICATIONS TO STOP TAKING
I will STOP taking the medications listed below when I get home from the hospital:    aspirin 81 mg oral tablet  -- 1 tab(s) by mouth once a day ( hold 7 days prior procedure, ok by cardiologist )

## 2017-01-09 NOTE — DISCHARGE NOTE ADULT - CARE PROVIDER_API CALL
Jl Oneill), Orthopaedic Surgery  [address] [place] [address]  Phone: [phone]  Fax: [phone]

## 2017-01-09 NOTE — DISCHARGE NOTE ADULT - HOME CARE AGENCY
[facility] 1/10 [phone] RN, P/T and OT evaluation

## 2017-01-09 NOTE — DISCHARGE NOTE ADULT - ADDITIONAL INSTRUCTIONS
Keep Aquacel dressing clean and dry   Continue ECOTRIN 325 mg by mouth 2x / day (at breakfast and at dinner) for a total of 6WEEKS   Follow up with Dr Oneill in 2 WEEKS for dressing REMOVAL, wound check, and staple/suture removal (if applicable)   Follow up with your private internist / PMD in 4-6 weeks re: general checkup (and possible medication adjustment)

## 2017-01-09 NOTE — DISCHARGE NOTE ADULT - HOSPITAL COURSE
Chief Complaint/Reason for Admission	left hip pain	    History of Present Illness:  HPI		  72 y.o. male with HTN, HLD, chronic aortic insufficiency  and osteoarthritis of both hips, s/p right THR 10/8/16, presents to [facility] for scheduled left THR on 1/7/16. Accompanied by wife.     Allergies/Medications:   Allergies:        Allergies:  	Keflex: Drug, Anaphylaxis    Home Medications:   * Patient Currently Takes Medications as of 27-Dec-2016 12:11 documented in Prescription Writer  · 	aspirin 81 mg oral tablet: Last Dose Taken:  , 1 tab(s) orally once a day ( hold 7 days prior procedure, ok by cardiologist )   · 	labetalol 100 mg oral tablet: Last Dose Taken:  , 1 tab(s) orally 2 times a day  · 	amLODIPine-benazepril 5 mg-20 mg oral capsule: Last Dose Taken:  , 1 cap(s) orally 2 times a day  · 	spironolactone 25 mg oral tablet: Last Dose Taken:  , 0.5 tab(s) orally every other day  · 	Vitamin D3 1000 intl units oral tablet: Last Dose Taken:  , 1 tab(s) orally once a day  · 	pravastatin 40 mg oral tablet: Last Dose Taken:  , 1 tab(s) orally once a day    PMH/PSH/FH/SH:   Past Medical History:  Aortic insufficiency  followed by cardiologist, last echo 11/2016  History of gout    HTN (hypertension)    Hyperlipidemia    Osteoarthritis of hip.    Past Surgical History:  History of cataract extraction  and detached retina surgery- right eyes  S/P hip replacement, right  10/8/16  S/P TURP (transurethral resection of prostate).    Hospital Course:   1/7:  Pt underwent L SALIMA w/ Dr Arias.  No complications noted  1/8:  Eval by PT:  WBAT  1/9: Pt stable for d/c -> rehab      Follow up Dr Oneill in office

## 2017-01-10 LAB — SURGICAL PATHOLOGY STUDY: SIGNIFICANT CHANGE UP

## 2017-01-20 ENCOUNTER — APPOINTMENT (OUTPATIENT)
Dept: ORTHOPEDIC SURGERY | Facility: CLINIC | Age: 72
End: 2017-01-20

## 2017-01-20 VITALS
DIASTOLIC BLOOD PRESSURE: 82 MMHG | BODY MASS INDEX: 29.62 KG/M2 | WEIGHT: 200 LBS | HEART RATE: 78 BPM | HEIGHT: 69 IN | SYSTOLIC BLOOD PRESSURE: 148 MMHG

## 2017-03-03 ENCOUNTER — APPOINTMENT (OUTPATIENT)
Dept: ORTHOPEDIC SURGERY | Facility: CLINIC | Age: 72
End: 2017-03-03

## 2017-03-03 VITALS
BODY MASS INDEX: 29.62 KG/M2 | SYSTOLIC BLOOD PRESSURE: 171 MMHG | HEIGHT: 69 IN | HEART RATE: 61 BPM | DIASTOLIC BLOOD PRESSURE: 102 MMHG | WEIGHT: 200 LBS

## 2018-02-02 ENCOUNTER — APPOINTMENT (OUTPATIENT)
Dept: ORTHOPEDIC SURGERY | Facility: CLINIC | Age: 73
End: 2018-02-02
Payer: MEDICARE

## 2018-02-02 VITALS
HEIGHT: 69 IN | HEART RATE: 61 BPM | DIASTOLIC BLOOD PRESSURE: 80 MMHG | WEIGHT: 210 LBS | SYSTOLIC BLOOD PRESSURE: 167 MMHG | BODY MASS INDEX: 31.1 KG/M2

## 2018-02-02 DIAGNOSIS — Z96.642 PRESENCE OF LEFT ARTIFICIAL HIP JOINT: ICD-10-CM

## 2018-02-02 DIAGNOSIS — Z96.641 PRESENCE OF RIGHT ARTIFICIAL HIP JOINT: ICD-10-CM

## 2018-02-02 PROCEDURE — 73521 X-RAY EXAM HIPS BI 2 VIEWS: CPT

## 2018-02-02 PROCEDURE — 99213 OFFICE O/P EST LOW 20 MIN: CPT

## 2018-02-07 PROBLEM — Z96.642 STATUS POST TOTAL REPLACEMENT OF LEFT HIP: Status: ACTIVE | Noted: 2017-01-20

## 2020-03-05 ENCOUNTER — INPATIENT (INPATIENT)
Facility: HOSPITAL | Age: 75
LOS: 2 days | Discharge: HOME | DRG: 640 | End: 2020-03-08
Attending: INTERNAL MEDICINE | Admitting: HOSPITALIST
Payer: MEDICARE

## 2020-03-05 VITALS
DIASTOLIC BLOOD PRESSURE: 77 MMHG | OXYGEN SATURATION: 99 % | WEIGHT: 216.93 LBS | HEIGHT: 68 IN | SYSTOLIC BLOOD PRESSURE: 142 MMHG | RESPIRATION RATE: 16 BRPM | TEMPERATURE: 98 F | HEART RATE: 76 BPM

## 2020-03-05 DIAGNOSIS — Z90.79 ACQUIRED ABSENCE OF OTHER GENITAL ORGAN(S): Chronic | ICD-10-CM

## 2020-03-05 DIAGNOSIS — Z98.49 CATARACT EXTRACTION STATUS, UNSPECIFIED EYE: Chronic | ICD-10-CM

## 2020-03-05 DIAGNOSIS — Z96.641 PRESENCE OF RIGHT ARTIFICIAL HIP JOINT: Chronic | ICD-10-CM

## 2020-03-05 PROCEDURE — 93010 ELECTROCARDIOGRAM REPORT: CPT | Mod: GC

## 2020-03-05 PROCEDURE — 99285 EMERGENCY DEPT VISIT HI MDM: CPT | Mod: GC

## 2020-03-05 NOTE — ED ADULT TRIAGE NOTE - CHIEF COMPLAINT QUOTE
Called in by PMD clinic for abnormal lab results. Went to PMD office secondary to weakness and have blood work and UA done and was called to be evaluated to ED for abn blood work.

## 2020-03-06 DIAGNOSIS — R53.1 WEAKNESS: ICD-10-CM

## 2020-03-06 DIAGNOSIS — E87.5 HYPERKALEMIA: ICD-10-CM

## 2020-03-06 DIAGNOSIS — N17.9 ACUTE KIDNEY FAILURE, UNSPECIFIED: ICD-10-CM

## 2020-03-06 DIAGNOSIS — I10 ESSENTIAL (PRIMARY) HYPERTENSION: ICD-10-CM

## 2020-03-06 DIAGNOSIS — E87.2 ACIDOSIS: ICD-10-CM

## 2020-03-06 DIAGNOSIS — Z29.9 ENCOUNTER FOR PROPHYLACTIC MEASURES, UNSPECIFIED: ICD-10-CM

## 2020-03-06 LAB
ALBUMIN SERPL ELPH-MCNC: 4.2 G/DL — SIGNIFICANT CHANGE UP (ref 3.3–5)
ALP SERPL-CCNC: 97 U/L — SIGNIFICANT CHANGE UP (ref 40–120)
ALT FLD-CCNC: 21 U/L — SIGNIFICANT CHANGE UP (ref 10–45)
ANION GAP SERPL CALC-SCNC: 12 MMOL/L — SIGNIFICANT CHANGE UP (ref 5–17)
ANION GAP SERPL CALC-SCNC: 13 MMOL/L — SIGNIFICANT CHANGE UP (ref 5–17)
ANION GAP SERPL CALC-SCNC: 13 MMOL/L — SIGNIFICANT CHANGE UP (ref 5–17)
APPEARANCE UR: CLEAR — SIGNIFICANT CHANGE UP
AST SERPL-CCNC: 21 U/L — SIGNIFICANT CHANGE UP (ref 10–40)
BASOPHILS # BLD AUTO: 0.06 K/UL — SIGNIFICANT CHANGE UP (ref 0–0.2)
BASOPHILS NFR BLD AUTO: 0.7 % — SIGNIFICANT CHANGE UP (ref 0–2)
BILIRUB SERPL-MCNC: 0.5 MG/DL — SIGNIFICANT CHANGE UP (ref 0.2–1.2)
BILIRUB UR-MCNC: NEGATIVE — SIGNIFICANT CHANGE UP
BUN SERPL-MCNC: 61 MG/DL — HIGH (ref 7–23)
BUN SERPL-MCNC: 61 MG/DL — HIGH (ref 7–23)
BUN SERPL-MCNC: 69 MG/DL — HIGH (ref 7–23)
CALCIUM SERPL-MCNC: 7.8 MG/DL — LOW (ref 8.4–10.5)
CALCIUM SERPL-MCNC: 9.1 MG/DL — SIGNIFICANT CHANGE UP (ref 8.4–10.5)
CALCIUM SERPL-MCNC: 9.3 MG/DL — SIGNIFICANT CHANGE UP (ref 8.4–10.5)
CHLORIDE SERPL-SCNC: 104 MMOL/L — SIGNIFICANT CHANGE UP (ref 96–108)
CHLORIDE SERPL-SCNC: 105 MMOL/L — SIGNIFICANT CHANGE UP (ref 96–108)
CHLORIDE SERPL-SCNC: 107 MMOL/L — SIGNIFICANT CHANGE UP (ref 96–108)
CO2 SERPL-SCNC: 16 MMOL/L — LOW (ref 22–31)
CO2 SERPL-SCNC: 16 MMOL/L — LOW (ref 22–31)
CO2 SERPL-SCNC: 19 MMOL/L — LOW (ref 22–31)
COLOR SPEC: SIGNIFICANT CHANGE UP
CREAT SERPL-MCNC: 2.17 MG/DL — HIGH (ref 0.5–1.3)
CREAT SERPL-MCNC: 2.27 MG/DL — HIGH (ref 0.5–1.3)
CREAT SERPL-MCNC: 2.71 MG/DL — HIGH (ref 0.5–1.3)
DIFF PNL FLD: NEGATIVE — SIGNIFICANT CHANGE UP
EOSINOPHIL # BLD AUTO: 0.31 K/UL — SIGNIFICANT CHANGE UP (ref 0–0.5)
EOSINOPHIL NFR BLD AUTO: 3.5 % — SIGNIFICANT CHANGE UP (ref 0–6)
GAS PNL BLDV: SIGNIFICANT CHANGE UP
GLUCOSE SERPL-MCNC: 111 MG/DL — HIGH (ref 70–99)
GLUCOSE SERPL-MCNC: 115 MG/DL — HIGH (ref 70–99)
GLUCOSE SERPL-MCNC: 161 MG/DL — HIGH (ref 70–99)
GLUCOSE UR QL: NEGATIVE — SIGNIFICANT CHANGE UP
HCT VFR BLD CALC: 35.7 % — LOW (ref 39–50)
HGB BLD-MCNC: 12.5 G/DL — LOW (ref 13–17)
IMM GRANULOCYTES NFR BLD AUTO: 0.3 % — SIGNIFICANT CHANGE UP (ref 0–1.5)
KETONES UR-MCNC: NEGATIVE — SIGNIFICANT CHANGE UP
LEUKOCYTE ESTERASE UR-ACNC: NEGATIVE — SIGNIFICANT CHANGE UP
LYMPHOCYTES # BLD AUTO: 3.15 K/UL — SIGNIFICANT CHANGE UP (ref 1–3.3)
LYMPHOCYTES # BLD AUTO: 35.3 % — SIGNIFICANT CHANGE UP (ref 13–44)
MAGNESIUM SERPL-MCNC: 1.9 MG/DL — SIGNIFICANT CHANGE UP (ref 1.6–2.6)
MCHC RBC-ENTMCNC: 30 PG — SIGNIFICANT CHANGE UP (ref 27–34)
MCHC RBC-ENTMCNC: 35 GM/DL — SIGNIFICANT CHANGE UP (ref 32–36)
MCV RBC AUTO: 85.8 FL — SIGNIFICANT CHANGE UP (ref 80–100)
MONOCYTES # BLD AUTO: 0.74 K/UL — SIGNIFICANT CHANGE UP (ref 0–0.9)
MONOCYTES NFR BLD AUTO: 8.3 % — SIGNIFICANT CHANGE UP (ref 2–14)
NEUTROPHILS # BLD AUTO: 4.63 K/UL — SIGNIFICANT CHANGE UP (ref 1.8–7.4)
NEUTROPHILS NFR BLD AUTO: 51.9 % — SIGNIFICANT CHANGE UP (ref 43–77)
NITRITE UR-MCNC: NEGATIVE — SIGNIFICANT CHANGE UP
NRBC # BLD: 0 /100 WBCS — SIGNIFICANT CHANGE UP (ref 0–0)
PH UR: 5.5 — SIGNIFICANT CHANGE UP (ref 5–8)
PHOSPHATE SERPL-MCNC: 3.5 MG/DL — SIGNIFICANT CHANGE UP (ref 2.5–4.5)
PLATELET # BLD AUTO: 250 K/UL — SIGNIFICANT CHANGE UP (ref 150–400)
POTASSIUM SERPL-MCNC: 4.9 MMOL/L — SIGNIFICANT CHANGE UP (ref 3.5–5.3)
POTASSIUM SERPL-MCNC: 5.8 MMOL/L — HIGH (ref 3.5–5.3)
POTASSIUM SERPL-MCNC: 6.7 MMOL/L — CRITICAL HIGH (ref 3.5–5.3)
POTASSIUM SERPL-SCNC: 4.9 MMOL/L — SIGNIFICANT CHANGE UP (ref 3.5–5.3)
POTASSIUM SERPL-SCNC: 5.8 MMOL/L — HIGH (ref 3.5–5.3)
POTASSIUM SERPL-SCNC: 6.7 MMOL/L — CRITICAL HIGH (ref 3.5–5.3)
PROT SERPL-MCNC: 7.6 G/DL — SIGNIFICANT CHANGE UP (ref 6–8.3)
PROT UR-MCNC: NEGATIVE — SIGNIFICANT CHANGE UP
RBC # BLD: 4.16 M/UL — LOW (ref 4.2–5.8)
RBC # FLD: 14.5 % — SIGNIFICANT CHANGE UP (ref 10.3–14.5)
SODIUM SERPL-SCNC: 132 MMOL/L — LOW (ref 135–145)
SODIUM SERPL-SCNC: 136 MMOL/L — SIGNIFICANT CHANGE UP (ref 135–145)
SODIUM SERPL-SCNC: 137 MMOL/L — SIGNIFICANT CHANGE UP (ref 135–145)
SP GR SPEC: 1.01 — SIGNIFICANT CHANGE UP (ref 1.01–1.02)
UROBILINOGEN FLD QL: NEGATIVE — SIGNIFICANT CHANGE UP
WBC # BLD: 8.92 K/UL — SIGNIFICANT CHANGE UP (ref 3.8–10.5)
WBC # FLD AUTO: 8.92 K/UL — SIGNIFICANT CHANGE UP (ref 3.8–10.5)

## 2020-03-06 PROCEDURE — 12345: CPT | Mod: NC

## 2020-03-06 PROCEDURE — 99223 1ST HOSP IP/OBS HIGH 75: CPT

## 2020-03-06 PROCEDURE — 76770 US EXAM ABDO BACK WALL COMP: CPT | Mod: 26

## 2020-03-06 RX ORDER — SODIUM ZIRCONIUM CYCLOSILICATE 10 G/10G
10 POWDER, FOR SUSPENSION ORAL DAILY
Refills: 0 | Status: DISCONTINUED | OUTPATIENT
Start: 2020-03-07 | End: 2020-03-08

## 2020-03-06 RX ORDER — ALBUTEROL 90 UG/1
10 AEROSOL, METERED ORAL ONCE
Refills: 0 | Status: COMPLETED | OUTPATIENT
Start: 2020-03-06 | End: 2020-03-06

## 2020-03-06 RX ORDER — AMLODIPINE BESYLATE 2.5 MG/1
5 TABLET ORAL DAILY
Refills: 0 | Status: DISCONTINUED | OUTPATIENT
Start: 2020-03-06 | End: 2020-03-08

## 2020-03-06 RX ORDER — DEXTROSE 50 % IN WATER 50 %
50 SYRINGE (ML) INTRAVENOUS ONCE
Refills: 0 | Status: COMPLETED | OUTPATIENT
Start: 2020-03-06 | End: 2020-03-06

## 2020-03-06 RX ORDER — SODIUM BICARBONATE 1 MEQ/ML
50 SYRINGE (ML) INTRAVENOUS ONCE
Refills: 0 | Status: COMPLETED | OUTPATIENT
Start: 2020-03-06 | End: 2020-03-06

## 2020-03-06 RX ORDER — SODIUM CHLORIDE 9 MG/ML
1000 INJECTION, SOLUTION INTRAVENOUS
Refills: 0 | Status: DISCONTINUED | OUTPATIENT
Start: 2020-03-06 | End: 2020-03-06

## 2020-03-06 RX ORDER — SODIUM CHLORIDE 9 MG/ML
1000 INJECTION INTRAMUSCULAR; INTRAVENOUS; SUBCUTANEOUS ONCE
Refills: 0 | Status: COMPLETED | OUTPATIENT
Start: 2020-03-06 | End: 2020-03-06

## 2020-03-06 RX ORDER — INSULIN HUMAN 100 [IU]/ML
5 INJECTION, SOLUTION SUBCUTANEOUS ONCE
Refills: 0 | Status: COMPLETED | OUTPATIENT
Start: 2020-03-06 | End: 2020-03-06

## 2020-03-06 RX ORDER — FUROSEMIDE 40 MG
80 TABLET ORAL ONCE
Refills: 0 | Status: COMPLETED | OUTPATIENT
Start: 2020-03-06 | End: 2020-03-06

## 2020-03-06 RX ORDER — SODIUM ZIRCONIUM CYCLOSILICATE 10 G/10G
10 POWDER, FOR SUSPENSION ORAL ONCE
Refills: 0 | Status: COMPLETED | OUTPATIENT
Start: 2020-03-06 | End: 2020-03-06

## 2020-03-06 RX ORDER — METOPROLOL TARTRATE 50 MG
100 TABLET ORAL
Refills: 0 | Status: DISCONTINUED | OUTPATIENT
Start: 2020-03-06 | End: 2020-03-08

## 2020-03-06 RX ORDER — SODIUM ZIRCONIUM CYCLOSILICATE 10 G/10G
10 POWDER, FOR SUSPENSION ORAL EVERY 8 HOURS
Refills: 0 | Status: COMPLETED | OUTPATIENT
Start: 2020-03-06 | End: 2020-03-07

## 2020-03-06 RX ORDER — METOPROLOL TARTRATE 50 MG
1 TABLET ORAL
Qty: 0 | Refills: 0 | DISCHARGE

## 2020-03-06 RX ORDER — SODIUM CHLORIDE 9 MG/ML
1000 INJECTION, SOLUTION INTRAVENOUS
Refills: 0 | Status: DISCONTINUED | OUTPATIENT
Start: 2020-03-06 | End: 2020-03-07

## 2020-03-06 RX ADMIN — SODIUM ZIRCONIUM CYCLOSILICATE 10 GRAM(S): 10 POWDER, FOR SUSPENSION ORAL at 03:09

## 2020-03-06 RX ADMIN — SODIUM ZIRCONIUM CYCLOSILICATE 10 GRAM(S): 10 POWDER, FOR SUSPENSION ORAL at 23:49

## 2020-03-06 RX ADMIN — SODIUM CHLORIDE 100 MILLILITER(S): 9 INJECTION, SOLUTION INTRAVENOUS at 06:45

## 2020-03-06 RX ADMIN — Medication 80 MILLIGRAM(S): at 01:59

## 2020-03-06 RX ADMIN — Medication 50 MILLIEQUIVALENT(S): at 01:55

## 2020-03-06 RX ADMIN — Medication 100 MILLIGRAM(S): at 23:48

## 2020-03-06 RX ADMIN — Medication 50 MILLILITER(S): at 01:55

## 2020-03-06 RX ADMIN — ALBUTEROL 10 MILLIGRAM(S): 90 AEROSOL, METERED ORAL at 01:55

## 2020-03-06 RX ADMIN — INSULIN HUMAN 5 UNIT(S): 100 INJECTION, SOLUTION SUBCUTANEOUS at 01:56

## 2020-03-06 RX ADMIN — SODIUM ZIRCONIUM CYCLOSILICATE 10 GRAM(S): 10 POWDER, FOR SUSPENSION ORAL at 14:25

## 2020-03-06 RX ADMIN — SODIUM CHLORIDE 75 MILLILITER(S): 9 INJECTION, SOLUTION INTRAVENOUS at 11:44

## 2020-03-06 RX ADMIN — SODIUM CHLORIDE 4000 MILLILITER(S): 9 INJECTION INTRAMUSCULAR; INTRAVENOUS; SUBCUTANEOUS at 09:00

## 2020-03-06 RX ADMIN — AMLODIPINE BESYLATE 5 MILLIGRAM(S): 2.5 TABLET ORAL at 11:55

## 2020-03-06 NOTE — CONSULT NOTE ADULT - SUBJECTIVE AND OBJECTIVE BOX
[facility] Division of Kidney Diseases & Hypertension  INITIAL CONSULT NOTE  [phone]--------------------------------------------------------------------------------  HPI: 75 year old male with history of HTN, HLD, Afib on Eliquis who presents to the hospital due to abnormal outpatient labs. Patient found to have JENNY and potassium of 6.5 on outpatient labs by PMD and directed to ED for further management. Of note patient has been on Spironolactone and Benazapril/Amlodipine combination. Patient        PAST HISTORY  --------------------------------------------------------------------------------  PAST MEDICAL & SURGICAL HISTORY:  Aortic insufficiency: followed by cardiologist, last echo 11/2016  History of gout  Hyperlipidemia  Osteoarthritis of hip  HTN (hypertension)  S/P TURP (transurethral resection of prostate)  S/P hip replacement, right: 10/8/16  History of cataract extraction: and detached retina surgery- right eyes    FAMILY HISTORY:  No pertinent family history in first degree relatives    PAST SOCIAL HISTORY:    ALLERGIES & MEDICATIONS  --------------------------------------------------------------------------------  Allergies    Keflex (Anaphylaxis)  Zocor (Anaphylaxis)    Intolerances      Standing Inpatient Medications  amLODIPine   Tablet 5 milliGRAM(s) Oral daily  metoprolol tartrate 100 milliGRAM(s) Oral two times a day  sodium chloride 0.45% 1000 milliLiter(s) IV Continuous <Continuous>  sodium zirconium cyclosilicate 10 Gram(s) Oral every 8 hours    PRN Inpatient Medications      REVIEW OF SYSTEMS  --------------------------------------------------------------------------------  Gen: No  fevers/chills, weakness  Skin: No rashes  Head/Eyes/Ears/Mouth: No headache; Normal hearing; Normal vision w/o blurriness;   Respiratory: No dyspnea, cough, wheezing, hemoptysis  CV: No chest pain,   GI: No abdominal pain, diarrhea, constipation, nausea, vomiting  : No increased frequency, dysuria, hematuria, nocturia  MSK: No joint pain/swelling;   Neuro: No dizziness/lightheadedness, weakness, seizures  Psych: No issues with affect    All other systems were reviewed and are negative, except as noted.    VITALS/PHYSICAL EXAM  --------------------------------------------------------------------------------  T(C): 36.7 (03-06-20 @ 09:50), Max: 36.9 (03-05-20 @ 23:56)  HR: 61 (03-06-20 @ 09:50) (61 - 76)  BP: 132/82 (03-06-20 @ 09:50) (121/77 - 142/77)  RR: 17 (03-06-20 @ 09:50) (16 - 17)  SpO2: 99% (03-06-20 @ 09:50) (98% - 99%)  Wt(kg): --  Height (cm): 172.72 (03-05-20 @ 23:56)  Weight (kg): 98.4 (03-05-20 @ 23:56)  BMI (kg/m2): 33 (03-05-20 @ 23:56)  BSA (m2): 2.12 (03-05-20 @ 23:56)      Physical Exam:  	Gen: NAD, well-appearing  	HEENT: PERRL, supple neck          Lymph: No palpable lymph nodes.  	Pulm: CTA B/L  	CV:  S1S2  	Abd: +BS, soft   	Ext: No B/L Lower ext edema  	Neuro: No focal deficits  	Skin: Warm, without rashes          Psych: Non-focal  	Vascular:          Access:    LABS/STUDIES  --------------------------------------------------------------------------------              12.5   8.92  >-----------<  250      [03-06-20 @ 00:42]              35.7     136  |  107  |  61  ----------------------------<  161      [03-06-20 @ 09:39]  4.9   |  16  |  2.17        Ca     7.8     [03-06-20 @ 09:39]      Mg     1.9     [03-06-20 @ 09:39]      Phos  3.5     [03-06-20 @ 09:39]    TPro  7.6  /  Alb  4.2  /  TBili  0.5  /  DBili  x   /  AST  21  /  ALT  21  /  AlkPhos  97  [03-06-20 @ 00:42]          Creatinine Trend:  SCr 2.17 [03-06 @ 09:39]  SCr 2.71 [03-06 @ 00:42]    Urinalysis - [03-06-20 @ 00:42]      Color Light Yellow / Appearance Clear / SG 1.012 / pH 5.5      Gluc Negative / Ketone Negative  / Bili Negative / Urobili Negative       Blood Negative / Protein Negative / Leuk Est Negative / Nitrite Negative      RBC  / WBC  / Hyaline  / Gran  / Sq Epi  / Non Sq Epi  / Bacteria [facility] Division of Kidney Diseases & Hypertension  INITIAL CONSULT NOTE  [phone]--------------------------------------------------------------------------------  HPI: 75 year old male with history of HTN, HLD, Afib on Eliquis who presents to the hospital due to abnormal outpatient labs. Patient found to have JENNY and potassium of 6.5 on outpatient labs by PMD and directed to ED for further management. Of note patient has been on Spironolactone and Benazapril/Amlodipine combination. Patient also had not been eating and drinking well at home for the last few weeks due to being primary care giver for his wife who is sick. He has also noted at home his BP has been  with BP medications with some dizziness and lightheadedness when getting up to stand. No other major complaints. Does not endorse high potassium diet. Nephrology consulted for management of JENNY/Hyperkalemia. On review of [facility] patient with creatinine 1-1.3 from 9/2016 to 1/2017 however no labs recorded in our system since then. Creatinine on presentation 2.71 with potassium of 6.7 and then after hyperkalemia medications given potassium 4.9 with creatinine of 2.17. Patient getting fluid resuscitation and states he is urinating well without issue.        PAST HISTORY  --------------------------------------------------------------------------------  PAST MEDICAL & SURGICAL HISTORY:  Aortic insufficiency: followed by cardiologist, last echo 11/2016  History of gout  Hyperlipidemia  Osteoarthritis of hip  HTN (hypertension)  S/P TURP (transurethral resection of prostate)  S/P hip replacement, right: 10/8/16  History of cataract extraction: and detached retina surgery- right eyes    FAMILY HISTORY:  No pertinent family history in first degree relatives    PAST SOCIAL HISTORY:    ALLERGIES & MEDICATIONS  --------------------------------------------------------------------------------  Allergies    Keflex (Anaphylaxis)  Zocor (Anaphylaxis)    Intolerances      Standing Inpatient Medications  amLODIPine   Tablet 5 milliGRAM(s) Oral daily  metoprolol tartrate 100 milliGRAM(s) Oral two times a day  sodium chloride 0.45% 1000 milliLiter(s) IV Continuous <Continuous>  sodium zirconium cyclosilicate 10 Gram(s) Oral every 8 hours    PRN Inpatient Medications      REVIEW OF SYSTEMS  --------------------------------------------------------------------------------  Gen: No  fevers/chills, weakness  Skin: No rashes  Head/Eyes/Ears/Mouth: No headache; Normal hearing; Normal vision w/o blurriness;   Respiratory: No dyspnea, cough, wheezing, hemoptysis  CV: No chest pain,   GI: No abdominal pain, diarrhea, constipation, nausea, vomiting  : No increased frequency, dysuria, hematuria, nocturia  MSK: No joint pain/swelling;   Neuro: No dizziness/lightheadedness, weakness, seizures  Psych: No issues with affect    All other systems were reviewed and are negative, except as noted.    VITALS/PHYSICAL EXAM  --------------------------------------------------------------------------------  T(C): 36.7 (03-06-20 @ 09:50), Max: 36.9 (03-05-20 @ 23:56)  HR: 61 (03-06-20 @ 09:50) (61 - 76)  BP: 132/82 (03-06-20 @ 09:50) (121/77 - 142/77)  RR: 17 (03-06-20 @ 09:50) (16 - 17)  SpO2: 99% (03-06-20 @ 09:50) (98% - 99%)  Wt(kg): --  Height (cm): 172.72 (03-05-20 @ 23:56)  Weight (kg): 98.4 (03-05-20 @ 23:56)  BMI (kg/m2): 33 (03-05-20 @ 23:56)  BSA (m2): 2.12 (03-05-20 @ 23:56)      Physical Exam:  	Gen: NAD, well-appearing  	HEENT: PERRL, supple neck              Lymph: No palpable lymph nodes.  	Pulm: CTA B/L  	CV:  S1S2  	Abd: +BS, soft   	Ext: No B/L Lower ext edema  	Neuro: No focal deficits  	Skin: Warm, without rashes              Psych: Non-focal  	Vascular: No cyanosis    LABS/STUDIES  --------------------------------------------------------------------------------              12.5   8.92  >-----------<  250      [03-06-20 @ 00:42]              35.7     136  |  107  |  61  ----------------------------<  161      [03-06-20 @ 09:39]  4.9   |  16  |  2.17        Ca     7.8     [03-06-20 @ 09:39]      Mg     1.9     [03-06-20 @ 09:39]      Phos  3.5     [03-06-20 @ 09:39]    TPro  7.6  /  Alb  4.2  /  TBili  0.5  /  DBili  x   /  AST  21  /  ALT  21  /  AlkPhos  97  [03-06-20 @ 00:42]          Creatinine Trend:  SCr 2.17 [03-06 @ 09:39]  SCr 2.71 [03-06 @ 00:42]    Urinalysis - [03-06-20 @ 00:42]      Color Light Yellow / Appearance Clear / SG 1.012 / pH 5.5      Gluc Negative / Ketone Negative  / Bili Negative / Urobili Negative       Blood Negative / Protein Negative / Leuk Est Negative / Nitrite Negative      RBC  / WBC  / Hyaline  / Gran  / Sq Epi  / Non Sq Epi  / Bacteria

## 2020-03-06 NOTE — ED CLERICAL - NS ED CLERK NOTE PRE-ARRIVAL INFORMATION; ADDITIONAL PRE-ARRIVAL INFORMATION
CC/Reason For referral: weakness,elevated potassium and creatinine  Preferred Consultant(if applicable): none  Who admits for you (if needed): [facility]  Do you have documents you would like to fax over?No  Would you still like to speak to an ED attending?No  Rpt BMP, treat with IVF, re eval can call PCP if needed, if pt stable can be D/C'd with outpatient F/U.

## 2020-03-06 NOTE — ED ADULT NURSE NOTE - OBJECTIVE STATEMENT
74 yo male from home A&OX4 sent by PMD for abnormal labs, pt unsure what labs. Pt st went to PMD c/o weakness. Pt currently denies all complaints, asymptomatic. Pt denies weakness, n/v/d/dizziness, abd pn - soft nontender, chx pn, SOB. VS stable. EKG performed. Neuro exam intact. PERRL. IV access obtained in LFA via 18G catheter, labs drawn and sent. Urine sample obtained and sent, Results pending.

## 2020-03-06 NOTE — PROGRESS NOTE ADULT - SUBJECTIVE AND OBJECTIVE BOX
Virgil Vargas MD  [phone] (nights and weekends)    SUBJECTIVE:  Patient seen and examined.  H&P from earlier today reviewed.  Resting in bed.  Feeling well.  Denies any SOB or nausea.  Denies any recent difficulty voiding.  NAD.    Tele: NSR, no events reported.    MEDICATIONS  (STANDING):  amLODIPine   Tablet 5 milliGRAM(s) Oral daily  metoprolol tartrate 100 milliGRAM(s) Oral two times a day  sodium chloride 0.45% 1000 milliLiter(s) (75 mL/Hr) IV Continuous <Continuous>  sodium chloride 0.45%. 1000 milliLiter(s) (100 mL/Hr) IV Continuous <Continuous>  sodium zirconium cyclosilicate 10 Gram(s) Oral every 8 hours    MEDICATIONS  (PRN):      Vital Signs Last 24 Hrs  T(C): 36.7 (06 Mar 2020 09:50), Max: 36.9 (05 Mar 2020 23:56)  T(F): 98.1 (06 Mar 2020 09:50), Max: 98.5 (05 Mar 2020 23:56)  HR: 61 (06 Mar 2020 09:50) (61 - 76)  BP: 132/82 (06 Mar 2020 09:50) (121/77 - 142/77)  BP(mean): --  RR: 17 (06 Mar 2020 09:50) (16 - 17)  SpO2: 99% (06 Mar 2020 09:50) (98% - 99%)    CAPILLARY BLOOD GLUCOSE      POCT Blood Glucose.: 101 mg/dL (06 Mar 2020 01:54)    I&O's Summary      PHYSICAL EXAM:  GENERAL: Looks stated age, NAD.  CARDIOVASCULAR: Normal S1, S2.  PULMONARY: Lungs clear to auscultation bilaterally. No wheezes/rales/rhonchi  GI: Abdomen soft, Nontender. Bowel sounds present  MSK/Ext:  No leg edema.  No calf tenderness bilaterally  PSYCH: Normal Affect. AAOx3      LABS:                        12.5   8.92  )-----------( 250      ( 06 Mar 2020 00:42 )             35.7     03-06    136  |  107  |  61<H>  ----------------------------<  161<H>  4.9   |  16<L>  |  2.17<H>    Ca    7.8<L>      06 Mar 2020 09:39  Phos  3.5     03-06  Mg     1.9     03-06    TPro  7.6  /  Alb  4.2  /  TBili  0.5  /  DBili  x   /  AST  21  /  ALT  21  /  AlkPhos  97  03-06          Urinalysis Basic - ( 06 Mar 2020 00:42 )    Color: Light Yellow / Appearance: Clear / S.012 / pH: x  Gluc: x / Ketone: Negative  / Bili: Negative / Urobili: Negative   Blood: x / Protein: Negative / Nitrite: Negative   Leuk Esterase: Negative / RBC: x / WBC x   Sq Epi: x / Non Sq Epi: x / Bacteria: x          RADIOLOGY & ADDITIONAL TESTS:

## 2020-03-06 NOTE — H&P ADULT - NSHPLABSRESULTS_GEN_ALL_CORE
Labs personally reviewed:                        12.5   8.92  )-----------( 250      ( 06 Mar 2020 00:42 )             35.7       03-06    132<L>  |  104  |  69<H>  ----------------------------<  111<H>  6.7<HH>   |  16<L>  |  2.71<H>    Ca    9.3      06 Mar 2020 00:42    TPro  7.6  /  Alb  4.2  /  TBili  0.5  /  DBili  x   /  AST  21  /  ALT  21  /  AlkPhos  97  03-06            LIVER FUNCTIONS - ( 06 Mar 2020 00:42 )  Alb: 4.2 g/dL / Pro: 7.6 g/dL / ALK PHOS: 97 U/L / ALT: 21 U/L / AST: 21 U/L / GGT: x                 Urinalysis Basic - ( 06 Mar 2020 00:42 )    Color: Light Yellow / Appearance: Clear / S.012 / pH: x  Gluc: x / Ketone: Negative  / Bili: Negative / Urobili: Negative   Blood: x / Protein: Negative / Nitrite: Negative   Leuk Esterase: Negative / RBC: x / WBC x   Sq Epi: x / Non Sq Epi: x / Bacteria: x    EKG reviewed    US Kidneys ordered

## 2020-03-06 NOTE — PROGRESS NOTE ADULT - PROBLEM SELECTOR PLAN 1
JENNY with hyperkalemia and metabolic acidosis.  Likely secondary to recent decreased PO intake with continued diuretic and ACE inhibitor use.  Case d/w nephrology attending and fellow.  Continue to hold Aldactone and ACE-I.  Change IVF to 1/2NS with 1.5 amps of bicarb at 75ml/hr.  Lokelma 10mg q8 today then daily starting tomorrow.  Repeat BMP at 4pm today.  Nephrology recommending to continue telemetry for now.  Renal US ordered.  Will also check a PVR by bladder scan.

## 2020-03-06 NOTE — ED PROVIDER NOTE - PHYSICAL EXAMINATION
GEN: Well appearing, well nourished, in no apparent distress.  HEAD: NCAT  HEENT: PERRL, EOMI, no nystagmus, no facial droop, Airway patent, uvula midline, MMM, neck supple, no LAD, no JVD, no raccoon sign, no raphael sign   LUNG: CTAB, no adventitious sounds, no retractions, no nasal flaring  CV: RRR, no murmurs,   Abd: soft, NTND, no rebound or guarding, BS+ in all quadrants, no CVAT  MSK: WWP, Pulses 2+ in extremities, No edema, no visible deformities, strength 5/5 in all extremities, FROM, no midspine TTP  Neuro:  CN II-XII in tact. AAOx3, Ambulatory with stable gait. sensations in tact in all extremities, neg pronator drift, neg finger to nose,  neg romberg  Skin: Warm and dry, no evidence of rash  Psych: normal mood and affect

## 2020-03-06 NOTE — H&P ADULT - NSICDXPASTMEDICALHX_GEN_ALL_CORE_FT
PAST MEDICAL HISTORY:  Aortic insufficiency followed by cardiologist, last echo 11/2016    History of gout     HTN (hypertension)     Hyperlipidemia     Osteoarthritis of hip

## 2020-03-06 NOTE — ED PROVIDER NOTE - CLINICAL SUMMARY MEDICAL DECISION MAKING FREE TEXT BOX
75 y.o. male with HTN, HLD, chronic aortic insufficiency  and osteoarthritis of both hips, s/p right THR 10/8/16 presents with elev K and Cr and generalized weakness since this AM that has now resolved after eating and drinking well all day. Labs taken today. Rpt labs to rule out acute need for dialysis. EKG. 75 y.o. male with HTN, HLD, chronic aortic insufficiency  and osteoarthritis of both hips, s/p right THR 10/8/16 presents with elev K and Cr and generalized weakness since this AM that has now resolved after eating and drinking well all day. Labs taken today. hyperkalemia likely 2/2 spironolactone. Rpt labs to rule out acute need for dialysis. EKG.

## 2020-03-06 NOTE — ED PROVIDER NOTE - OBJECTIVE STATEMENT
75 y.o. male with HTN, HLD, chronic aortic insufficiency  and osteoarthritis of both hips, s/p right THR 10/8/16 presents with elev K and generalized weakness since this AM that has now resolved after eating and drinking well all day. Labs taken today. Patient denies chest pain, SOB, f/c, cough, abd pain, N/V/D/C, HA, dizziness, urinary symptoms, extremity pain or swelling or other complaints. 75 y.o. male with Hypertension, on Spironolactone, HLD, chronic aortic insufficiency  and osteoarthritis of both hips, s/p right THR 10/8/16 presents with elev K and generalized weakness since this AM that has now resolved after eating and drinking well all day. Labs taken today. Patient denies chest pain, SOB, f/c, cough, abd pain, N/V/D/C, HA, dizziness, urinary symptoms, extremity pain or swelling or other complaints.

## 2020-03-06 NOTE — ED ADULT NURSE NOTE - ISOLATION TYPE:
If you have any questions or concerns about today's appointment, the verbal and/or written instructions you were given for follow up care, please call our office at [phone].     [facility] - [address]    [phone] office  [phone] None

## 2020-03-06 NOTE — ED PROVIDER NOTE - PROGRESS NOTE DETAILS
Dr Sullivan: nephro called about high Cr and K,  wants lasix 80mg , amp bicarb and dextrose with insulin, lokelma.  bladder scanned and not in retention

## 2020-03-06 NOTE — H&P ADULT - ASSESSMENT
75 y.o. male with HTN on spironolactone, HLD, chronic aortic insufficiency  and osteoarthritis of both hips, s/p right THR 10/8/16 presents with generalized weakness x 2 weeks found to have elevated creatinine and potassium by PMD yesterday.

## 2020-03-06 NOTE — PROGRESS NOTE ADULT - ASSESSMENT
The patient is a 75-year-old man with PMH of HTN on spironolactone, HLD, chronic aortic insufficiency and osteoarthritis of both hips s/p right THR 10/8/16 who presented with generalized weakness x 2 weeks found to have elevated creatinine and potassium by PMD yesterday.

## 2020-03-06 NOTE — ED PROVIDER NOTE - CARE PLAN
Principal Discharge DX:	Generalized weakness  Secondary Diagnosis:	Hyperkalemia Principal Discharge DX:	Generalized weakness  Secondary Diagnosis:	Hyperkalemia  Secondary Diagnosis:	JENNY (acute kidney injury)

## 2020-03-06 NOTE — CONSULT NOTE ADULT - PROBLEM SELECTOR RECOMMENDATION 3
CO2 16 currently. Continue with bicarbonate based fluids. Should resolve as JENNY starts to resolve. Continue to monitor with above regimen.

## 2020-03-06 NOTE — H&P ADULT - NSICDXPASTSURGICALHX_GEN_ALL_CORE_FT
PAST SURGICAL HISTORY:  History of cataract extraction and detached retina surgery- right eyes    S/P hip replacement, right 10/8/16    S/P TURP (transurethral resection of prostate)

## 2020-03-06 NOTE — CONSULT NOTE ADULT - PROBLEM SELECTOR RECOMMENDATION 2
Likely secondary to decreased ECV, and spironolactone/benazapril use. HOLD medications as above, continue resuscitation and continue to check potassium daily for now as it is currently 4.9. Low potassium diet for now. Can continue with Lokelma 10 mg TID for one day, and then switch to Lokelma 10 mg Daily for 2 days.

## 2020-03-06 NOTE — ED PROVIDER NOTE - NS ED ROS FT
Constitutional: no fevers, no chills. +generalized weakness  Eyes: no visual changes.  Ears: no ear drainage, no ear pain.  Nose: no nasal congestion.  Mouth/Throat: no sore throat.  Cardiovascular: no chest pain.  Respiratory: no shortness of breath, no wheezing, no cough  Gastrointestinal: no nausea, no vomiting, no diarrhea, no abdominal pain.  MSK: no flank pain, no back pain.  Genitourinary: no dysuria, no hematuria.  Skin: no rashes.  Neuro: no headache

## 2020-03-06 NOTE — ED PROVIDER NOTE - ATTENDING CONTRIBUTION TO CARE
MD Lin:  patient seen and evaluated personally.   I agree with the History & Physical,  Impression & Plan other than what was detailed in my note.  MD Lin      72 y.o. male with HTN, HLD, chronic aortic insufficiency  and osteoarthritis of both hips MD Lin:  patient seen and evaluated personally.   I agree with the History & Physical,  Impression & Plan other than what was detailed in my note.  MD Lin      72 y.o. male with HTN, HLD, chronic aortic insufficiency  and osteoarthritis of both hips, presents to ed w/ report of elevated k and increased cr. pt is asymptomatic. no f/c n/v, diarrhea, cp, palp, sncope, ha. afebrile. vitals stalbe, non toxic, exam benign. ekg unremarkble. plan to recheck labs, if hyperk worsened cr will need admission

## 2020-03-06 NOTE — ED ADULT NURSE NOTE - NS ED NURSE LEVEL OF CONSCIOUSNESS AFFECT
Calm Trilobed Flap Text: The defect edges were debeveled with a #15 scalpel blade.  Given the location of the defect and the proximity to free margins a trilobed flap was deemed most appropriate.  Using a sterile surgical marker, an appropriate trilobed flap drawn around the defect.    The area thus outlined was incised deep to adipose tissue with a #15 scalpel blade.  The skin margins were undermined to an appropriate distance in all directions utilizing iris scissors.

## 2020-03-06 NOTE — H&P ADULT - NSHPPHYSICALEXAM_GEN_ALL_CORE
Vital Signs Last 24 Hrs  T(C): 36.7 (06 Mar 2020 03:30), Max: 36.9 (05 Mar 2020 23:56)  T(F): 98 (06 Mar 2020 03:30), Max: 98.5 (05 Mar 2020 23:56)  HR: 75 (06 Mar 2020 03:30) (75 - 76)  BP: 121/77 (06 Mar 2020 03:30) (121/77 - 142/77)  BP(mean): --  RR: 16 (06 Mar 2020 03:30) (16 - 16)  SpO2: 98% (06 Mar 2020 03:30) (98% - 99%)    GENERAL: No acute distress, well-developed  HEAD:  Atraumatic, Normocephalic  EYES: EOMI, PERRLA, conjunctiva and sclera clear  ENT: Oral mucosa moist  NECK: Neck supple  CHEST/LUNG: Clear to auscultation bilaterally; No wheeze, no rales, no rhonchi.    HEART: Regular rate and rhythm; No murmurs, rubs, or gallops  ABDOMEN: Soft, Nontender, Nondistended; Bowel sounds present  EXTREMITIES:  No clubbing, cyanosis, or edema  VASCULAR: Posterior tibialis pulses intact bilaterally  PSYCH: Normal behavior, normal affect  NEUROLOGY: AAOx3  SKIN: grossly warm and dry

## 2020-03-06 NOTE — PROGRESS NOTE ADULT - PROBLEM SELECTOR PLAN 3
Holding ACE-I and Spironolactone as above.   Continue Norvasc and metoprolol with hold parameters and monitor BP.

## 2020-03-06 NOTE — CONSULT NOTE ADULT - ATTENDING COMMENTS
#Td- likely volume depletion prerenal VS ATN in the setting of spironolactone and ACE/decreased po intake and low BP 80s at home;   plan for IVF today and tomorrow  dc ace, sln  monitor cr trend  bladder scan r.o retention  renal sono  cardoso if cr uptrends  #hyperkalemia- lokelma 10mg tid and then daily until k normalizes. bicarb drip; hold ace/sprinolactone; repeat k this afternoon  #metabolic acidosis- bicarb drip; monitor bicarb and pH  #clarify baseline creatinine

## 2020-03-06 NOTE — CONSULT NOTE ADULT - PROBLEM SELECTOR RECOMMENDATION 9
Patient with JENNY likely hemodynamically mediated in the setting of ACE use and decreased PO intake and low BP. Patient fluid resuscitated and creatinine downtrending so renal injury likely pre-renal in nature. Unsure of baseline creatinine however 1-1.3 in [id number]. Possible component of CKD here however only risk factor is HTN. Would continue fluid resuscitation with bicarbonate based fluids. HOLD Benazapril and Spironolactone. Avoid nephrotoxic agents. Renally dose all medications. Avoid hypotensive episodes.

## 2020-03-06 NOTE — H&P ADULT - HISTORY OF PRESENT ILLNESS
75 y.o. male with Hypertension on Spironolactone, HLD, chronic aortic insufficiency  and osteoarthritis of both hips, s/p right THR 10/8/16 presents with generalized weakness x 2 weeks found to have elevated creatinine and potassium by PMD yesterday. 75 y.o. male with HTN on spironolactone, HLD, chronic aortic insufficiency  and osteoarthritis of both hips, s/p right THR 10/8/16 presents with generalized weakness x 2 weeks found to have elevated creatinine and potassium by PMD yesterday. Patient reports that his wife had gotten sick 2 weeks ago and required a surgery and because of that he had been helping her recover. Patient reports that because of this stressor he had not been eating as much or drinking as much water as he normally did though he has continued to take his medications all the same. No recent changes in medications. Patient had noticed then that over the last 2 weeks he had progressive generalized weakness and tiredness and had gone to his doctor who felt he might be dehydrated and la labs. His PMD also asked him to discontinue his spironolactone yesterday and to drink lots of water when he got home. patient did this and reports that he felt better later in the day yesterday, however when his labs came back his potassium was high and his kidney function was low and his PMD told him to go to the ED

## 2020-03-06 NOTE — H&P ADULT - PROBLEM SELECTOR PLAN 1
Likely 2/2 dehydration given decreased intake and continued diuretic use in setting of stressor as in HPI  s/p lasix in ED but not given IV fluids yet  Start IV fluids  F/U nephrology consult  Would avoid further aggressive diuresis once K+ improved

## 2020-03-06 NOTE — H&P ADULT - PROBLEM SELECTOR PLAN 4
Holding ACE component of BP medication as well as spironolactone given JENNY and Hyperkalemia  Monitor BPs  If BPs become significantly elevated can consider uptitration of amlodipine or addition of hydralazine.   Continue metoprolol with hold paramters

## 2020-03-07 LAB
ANION GAP SERPL CALC-SCNC: 12 MMOL/L — SIGNIFICANT CHANGE UP (ref 5–17)
ANION GAP SERPL CALC-SCNC: 12 MMOL/L — SIGNIFICANT CHANGE UP (ref 5–17)
ANION GAP SERPL CALC-SCNC: 13 MMOL/L — SIGNIFICANT CHANGE UP (ref 5–17)
BUN SERPL-MCNC: 48 MG/DL — HIGH (ref 7–23)
BUN SERPL-MCNC: 54 MG/DL — HIGH (ref 7–23)
BUN SERPL-MCNC: 57 MG/DL — HIGH (ref 7–23)
CALCIUM SERPL-MCNC: 9 MG/DL — SIGNIFICANT CHANGE UP (ref 8.4–10.5)
CALCIUM SERPL-MCNC: 9.6 MG/DL — SIGNIFICANT CHANGE UP (ref 8.4–10.5)
CALCIUM SERPL-MCNC: 9.6 MG/DL — SIGNIFICANT CHANGE UP (ref 8.4–10.5)
CHLORIDE SERPL-SCNC: 103 MMOL/L — SIGNIFICANT CHANGE UP (ref 96–108)
CHLORIDE SERPL-SCNC: 103 MMOL/L — SIGNIFICANT CHANGE UP (ref 96–108)
CHLORIDE SERPL-SCNC: 104 MMOL/L — SIGNIFICANT CHANGE UP (ref 96–108)
CO2 SERPL-SCNC: 19 MMOL/L — LOW (ref 22–31)
CO2 SERPL-SCNC: 21 MMOL/L — LOW (ref 22–31)
CO2 SERPL-SCNC: 22 MMOL/L — SIGNIFICANT CHANGE UP (ref 22–31)
CREAT SERPL-MCNC: 1.9 MG/DL — HIGH (ref 0.5–1.3)
CREAT SERPL-MCNC: 2.01 MG/DL — HIGH (ref 0.5–1.3)
CREAT SERPL-MCNC: 2.04 MG/DL — HIGH (ref 0.5–1.3)
CULTURE RESULTS: NO GROWTH — SIGNIFICANT CHANGE UP
GLUCOSE SERPL-MCNC: 104 MG/DL — HIGH (ref 70–99)
GLUCOSE SERPL-MCNC: 124 MG/DL — HIGH (ref 70–99)
GLUCOSE SERPL-MCNC: 96 MG/DL — SIGNIFICANT CHANGE UP (ref 70–99)
HCV AB S/CO SERPL IA: 0.2 S/CO — SIGNIFICANT CHANGE UP (ref 0–0.99)
HCV AB SERPL-IMP: SIGNIFICANT CHANGE UP
POTASSIUM SERPL-MCNC: 5.6 MMOL/L — HIGH (ref 3.5–5.3)
POTASSIUM SERPL-MCNC: 5.7 MMOL/L — HIGH (ref 3.5–5.3)
POTASSIUM SERPL-MCNC: 6.3 MMOL/L — CRITICAL HIGH (ref 3.5–5.3)
POTASSIUM SERPL-SCNC: 5.6 MMOL/L — HIGH (ref 3.5–5.3)
POTASSIUM SERPL-SCNC: 5.7 MMOL/L — HIGH (ref 3.5–5.3)
POTASSIUM SERPL-SCNC: 6.3 MMOL/L — CRITICAL HIGH (ref 3.5–5.3)
SODIUM SERPL-SCNC: 136 MMOL/L — SIGNIFICANT CHANGE UP (ref 135–145)
SODIUM SERPL-SCNC: 136 MMOL/L — SIGNIFICANT CHANGE UP (ref 135–145)
SODIUM SERPL-SCNC: 137 MMOL/L — SIGNIFICANT CHANGE UP (ref 135–145)
SPECIMEN SOURCE: SIGNIFICANT CHANGE UP

## 2020-03-07 PROCEDURE — 93010 ELECTROCARDIOGRAM REPORT: CPT

## 2020-03-07 PROCEDURE — 99233 SBSQ HOSP IP/OBS HIGH 50: CPT | Mod: GC

## 2020-03-07 RX ORDER — SODIUM ZIRCONIUM CYCLOSILICATE 10 G/10G
10 POWDER, FOR SUSPENSION ORAL ONCE
Refills: 0 | Status: COMPLETED | OUTPATIENT
Start: 2020-03-07 | End: 2020-03-07

## 2020-03-07 RX ADMIN — SODIUM ZIRCONIUM CYCLOSILICATE 10 GRAM(S): 10 POWDER, FOR SUSPENSION ORAL at 11:18

## 2020-03-07 RX ADMIN — SODIUM ZIRCONIUM CYCLOSILICATE 10 GRAM(S): 10 POWDER, FOR SUSPENSION ORAL at 16:39

## 2020-03-07 RX ADMIN — Medication 100 MILLIGRAM(S): at 05:39

## 2020-03-07 RX ADMIN — SODIUM CHLORIDE 75 MILLILITER(S): 9 INJECTION, SOLUTION INTRAVENOUS at 14:19

## 2020-03-07 RX ADMIN — AMLODIPINE BESYLATE 5 MILLIGRAM(S): 2.5 TABLET ORAL at 05:39

## 2020-03-07 RX ADMIN — SODIUM ZIRCONIUM CYCLOSILICATE 10 GRAM(S): 10 POWDER, FOR SUSPENSION ORAL at 05:39

## 2020-03-07 NOTE — PROGRESS NOTE ADULT - PROBLEM SELECTOR PLAN 1
JENNY with hyperkalemia and metabolic acidosis.  Likely secondary to recent decreased PO intake with continued diuretic and ACE inhibitor use.  Appreciate nephrology  Continue to hold Aldactone and ACE-I.  Can likely stop bicarbonate component if OK with renal  Lokelma 10mg q8 today then daily starting tomorrow.  Continue telemetry for now.  Renal US without hydronephrosis.

## 2020-03-07 NOTE — PROGRESS NOTE ADULT - PROBLEM SELECTOR PLAN 2
Likely secondary to decreased ECV, and spironolactone/benazapril use. HOLD medications as above, continue resuscitation and continue to check potassium daily for now as it is currently 5.7. Low potassium diet for now.   Continue with Lokelma 10 gram daily for now.  Please recheck BMP 2-3 hours after lokelma dose given.  If still high (K>5.3) can given additional dose loklema 10 gram x 1 dose and repeat BMP afterward.  Please check EKG for hyperkalemia changes (peak T wave, wide QRS, prolong [place]), if present can give calcium gluconate IV and repeat EKG afterward.    Plan discussed with NP.  Call nephrology fellow if any questions. Likely secondary to decreased ECV, and spironolactone/benazapril use. HOLD medications as above, continue to check potassium daily for now as it is currently 5.7. Low potassium diet for now.   Continue with Lokelma 10 gram daily for now.  Please recheck BMP 2-3 hours after lokelma dose given.  If still high (K>5.3) can given additional dose loklema 10 gram x 1 dose and repeat BMP afterward.  Please check EKG for hyperkalemia changes (peak T wave, wide QRS, prolong [place]), if present can give calcium gluconate IV and repeat EKG afterward.    Plan discussed with NP.  Call nephrology fellow if any questions.

## 2020-03-07 NOTE — PROGRESS NOTE ADULT - PROBLEM SELECTOR PLAN 3
Continue with bicarbonate based fluids. Continue to monitor with above regimen. likely 2/2 JENNY, improving today HCO3 21  monitor hco3 daily

## 2020-03-07 NOTE — PROGRESS NOTE ADULT - PROBLEM SELECTOR PLAN 1
Patient with JENNY likely hemodynamically mediated in the setting of ACE use and decreased PO intake and low BP. Patient fluid resuscitated and creatinine downtrending so renal injury likely pre-renal in nature. Unsure of baseline creatinine however 1-1.3 in [id number]. Possible component of CKD here however only risk factor is HTN. Would continue fluid resuscitation with bicarbonate based fluids. HOLD Benazapril and Spironolactone. Avoid nephrotoxic agents. Renally dose all medications. Avoid hypotensive episodes.  Monitor UOP, daily BMP Patient with JENNY likely hemodynamically mediated in the setting of ACE use and decreased PO intake and low BP. Patient fluid resuscitated and creatinine downtrending so renal injury likely pre-renal in nature. Unsure of baseline creatinine however 1-1.3 in 8415-0033. Possible component of CKD here however only risk factor is HTN. Can discontinue fluid resuscitation given patient eating/drinking with improving bicarb level. HOLD Benazapril and Spironolactone. Avoid nephrotoxic agents. Renally dose all medications. Avoid hypotensive episodes.  Monitor UOP, daily BMP

## 2020-03-07 NOTE — PROGRESS NOTE ADULT - PROBLEM SELECTOR PLAN 4
IMPROVE score of 1.  In addition, receiving continuous IVF, may limit mobility.  SCDs for DVT prophylaxis.
IMPROVE score of 1.  In addition, receiving continuous IVF, may limit mobility.  Will add SCDs for DVT prophylaxis.

## 2020-03-07 NOTE — PROGRESS NOTE ADULT - SUBJECTIVE AND OBJECTIVE BOX
[facility] DIVISION OF KIDNEY DISEASES AND HYPERTENSION -- FOLLOW UP NOTE  Alex Alvarez  Nephrology Fellow  Pager NS: [phone]   --------------------------------------------------------------------------------  Chief Complaint: JENNY    24 hour events/subjective:  - overnight no events reported, vitals afebrile, no UOP documented  - patient seen and examined at bedside this morning without complaints reports overall feels well and making good amount UOP.  - vitals/lab/medications reviewed, noted for downtrending serum K 6.3 to 5.7 after medical management (being given lokelma while examining)        PAST HISTORY  --------------------------------------------------------------------------------  No significant changes to PMH, PSH, FHx, SHx, unless otherwise noted    ALLERGIES & MEDICATIONS  --------------------------------------------------------------------------------  Allergies    Keflex (Anaphylaxis)  Zocor (Anaphylaxis)    Intolerances      Standing Inpatient Medications  amLODIPine   Tablet 5 milliGRAM(s) Oral daily  metoprolol tartrate 100 milliGRAM(s) Oral two times a day  sodium chloride 0.45% 1000 milliLiter(s) IV Continuous <Continuous>  sodium zirconium cyclosilicate 10 Gram(s) Oral daily    PRN Inpatient Medications      REVIEW OF SYSTEMS  --------------------------------------------------------------------------------  Gen: no fever, chills, weakness  Respiratory: No dyspnea, cough  CV: No chest pain, orthopnea  GI: No abdominal pain, nausea, vomiting, diarrhea  MSK: no edema  Neuro: No dizziness, lightheadedness  Heme: No bleeding    All other systems were reviewed and are negative, except as noted.    VITALS/PHYSICAL EXAM  --------------------------------------------------------------------------------  T(C): 36.8 (03-07-20 @ 11:13), Max: 37.2 (03-06-20 @ 21:13)  HR: 58 (03-07-20 @ 11:13) (58 - 76)  BP: 124/82 (03-07-20 @ 11:13) (124/82 - 146/88)  RR: 18 (03-07-20 @ 11:13) (18 - 18)  SpO2: 97% (03-07-20 @ 11:13) (95% - 100%)  Wt(kg): --  Height (cm): 172.72 (03-05-20 @ 23:56)  Weight (kg): 98.4 (03-05-20 @ 23:56)  BMI (kg/m2): 33 (03-05-20 @ 23:56)  BSA (m2): 2.12 (03-05-20 @ 23:56)      03-07-20 @ 06:01  -  03-07-20 @ 11:29  --------------------------------------------------------  IN: 360 mL / OUT: 0 mL / NET: 360 mL      Physical Exam:  	Gen: NAD, well-appearing on room air  	HEENT: moist mucous membrane, no JVD, sclera clear  	Pulm: CTA B/L, no crackles  	CV: RRR, S1S2; no rub/murmur  	GI: +BS, soft, nontender/nondistended  	MSK: Warm, no edema, no asterixis  	Psych: Normal affect and mood  	Skin: Warm, no cyanosis      LABS/STUDIES  --------------------------------------------------------------------------------              12.5   8.92  >-----------<  250      [03-06-20 @ 00:42]              35.7     137  |  103  |  54  ----------------------------<  104      [03-07-20 @ 08:28]  5.7   |  22  |  2.04        Ca     9.6     [03-07-20 @ 08:28]      Mg     1.9     [03-06-20 @ 09:39]      Phos  3.5     [03-06-20 @ 09:39]    TPro  7.6  /  Alb  4.2  /  TBili  0.5  /  DBili  x   /  AST  21  /  ALT  21  /  AlkPhos  97  [03-06-20 @ 00:42]          Creatinine Trend:  SCr 2.04 [03-07 @ 08:28]  SCr 2.01 [03-07 @ 06:19]  SCr 2.27 [03-06 @ 20:33]  SCr 2.17 [03-06 @ 09:39]  SCr 2.71 [03-06 @ 00:42]    Urinalysis - [03-06-20 @ 00:42]      Color Light Yellow / Appearance Clear / SG 1.012 / pH 5.5      Gluc Negative / Ketone Negative  / Bili Negative / Urobili Negative       Blood Negative / Protein Negative / Leuk Est Negative / Nitrite Negative      RBC  / WBC  / Hyaline  / Gran  / Sq Epi  / Non Sq Epi  / Bacteria

## 2020-03-07 NOTE — PROGRESS NOTE ADULT - SUBJECTIVE AND OBJECTIVE BOX
Feels well  Ambulating well  Urinating well    Tele: NSR, no events reported.    Vital Signs Last 24 Hrs  T(C): 36.8 (07 Mar 2020 04:54), Max: 37.2 (06 Mar 2020 21:13)  T(F): 98.2 (07 Mar 2020 04:54), Max: 99 (06 Mar 2020 21:13)  HR: 69 (07 Mar 2020 04:54) (61 - 76)  BP: 131/82 (07 Mar 2020 04:54) (131/82 - 146/88)  BP(mean): --  RR: 18 (07 Mar 2020 04:54) (17 - 18)  SpO2: 97% (07 Mar 2020 04:54) (95% - 100%)      GENERAL: NAD  HEAD:  Atraumatic, Normocephalic  EYES: EOMI, PERRLA, conjunctiva and sclera clear  MOUTH/THROAT: no swelling, no erythema, no lesions, no exudates  NECK: Supple, No JVD, No LAD, No carotid bruits  CHEST/LUNG: Clear to auscultation bilaterally; No wheezes or rales  HEART: Regular rate and rhythm; nl S1/S2; No murmurs, rubs, or gallops  ABDOMEN: Soft, Nontender, Nondistended; Bowel sounds present  EXTREMITIES:  2+ Peripheral Pulses; No clubbing, cyanosis, or edema b/l  SKIN: No rashes or lesions; No jaundice  NEURO: A+O x 3; nonfocal CN/motor/sensory/reflexes  PSYCH: Nl affect; no delirium or agitation; no suicidal/homicidal ideation      LABS:                        12.5   8.92  )-----------( 250      ( 06 Mar 2020 00:42 )             35.7     03-07    136  |  104  |  57<H>  ----------------------------<  96  6.3<HH>   |  19<L>  |  2.01<H>    Ca    9.0      07 Mar 2020 06:19  Phos  3.5     03-06  Mg     1.9     03-06    TPro  7.6  /  Alb  4.2  /  TBili  0.5  /  DBili  x   /  AST  21  /  ALT  21  /  AlkPhos  97  03-06      CAPILLARY BLOOD GLUCOSE            Urinalysis Basic - ( 06 Mar 2020 00:42 )    Color: Light Yellow / Appearance: Clear / S.012 / pH: x  Gluc: x / Ketone: Negative  / Bili: Negative / Urobili: Negative   Blood: x / Protein: Negative / Nitrite: Negative   Leuk Esterase: Negative / RBC: x / WBC x   Sq Epi: x / Non Sq Epi: x / Bacteria: x

## 2020-03-08 ENCOUNTER — TRANSCRIPTION ENCOUNTER (OUTPATIENT)
Age: 75
End: 2020-03-08

## 2020-03-08 VITALS
DIASTOLIC BLOOD PRESSURE: 95 MMHG | HEART RATE: 55 BPM | TEMPERATURE: 98 F | RESPIRATION RATE: 18 BRPM | OXYGEN SATURATION: 97 % | SYSTOLIC BLOOD PRESSURE: 140 MMHG

## 2020-03-08 LAB
ANION GAP SERPL CALC-SCNC: 11 MMOL/L — SIGNIFICANT CHANGE UP (ref 5–17)
BUN SERPL-MCNC: 42 MG/DL — HIGH (ref 7–23)
CALCIUM SERPL-MCNC: 9.5 MG/DL — SIGNIFICANT CHANGE UP (ref 8.4–10.5)
CHLORIDE SERPL-SCNC: 106 MMOL/L — SIGNIFICANT CHANGE UP (ref 96–108)
CO2 SERPL-SCNC: 22 MMOL/L — SIGNIFICANT CHANGE UP (ref 22–31)
CREAT SERPL-MCNC: 1.74 MG/DL — HIGH (ref 0.5–1.3)
GLUCOSE SERPL-MCNC: 103 MG/DL — HIGH (ref 70–99)
HCT VFR BLD CALC: 33.1 % — LOW (ref 39–50)
HGB BLD-MCNC: 11.2 G/DL — LOW (ref 13–17)
MCHC RBC-ENTMCNC: 28.9 PG — SIGNIFICANT CHANGE UP (ref 27–34)
MCHC RBC-ENTMCNC: 33.8 GM/DL — SIGNIFICANT CHANGE UP (ref 32–36)
MCV RBC AUTO: 85.5 FL — SIGNIFICANT CHANGE UP (ref 80–100)
NRBC # BLD: 0 /100 WBCS — SIGNIFICANT CHANGE UP (ref 0–0)
PLATELET # BLD AUTO: 213 K/UL — SIGNIFICANT CHANGE UP (ref 150–400)
POTASSIUM SERPL-MCNC: 5 MMOL/L — SIGNIFICANT CHANGE UP (ref 3.5–5.3)
POTASSIUM SERPL-SCNC: 5 MMOL/L — SIGNIFICANT CHANGE UP (ref 3.5–5.3)
RBC # BLD: 3.87 M/UL — LOW (ref 4.2–5.8)
RBC # FLD: 14.2 % — SIGNIFICANT CHANGE UP (ref 10.3–14.5)
SODIUM SERPL-SCNC: 139 MMOL/L — SIGNIFICANT CHANGE UP (ref 135–145)
WBC # BLD: 7.09 K/UL — SIGNIFICANT CHANGE UP (ref 3.8–10.5)
WBC # FLD AUTO: 7.09 K/UL — SIGNIFICANT CHANGE UP (ref 3.8–10.5)

## 2020-03-08 PROCEDURE — 82330 ASSAY OF CALCIUM: CPT

## 2020-03-08 PROCEDURE — 83735 ASSAY OF MAGNESIUM: CPT

## 2020-03-08 PROCEDURE — 94640 AIRWAY INHALATION TREATMENT: CPT

## 2020-03-08 PROCEDURE — 84100 ASSAY OF PHOSPHORUS: CPT

## 2020-03-08 PROCEDURE — 87086 URINE CULTURE/COLONY COUNT: CPT

## 2020-03-08 PROCEDURE — 76770 US EXAM ABDO BACK WALL COMP: CPT

## 2020-03-08 PROCEDURE — 86803 HEPATITIS C AB TEST: CPT

## 2020-03-08 PROCEDURE — 83605 ASSAY OF LACTIC ACID: CPT

## 2020-03-08 PROCEDURE — 82435 ASSAY OF BLOOD CHLORIDE: CPT

## 2020-03-08 PROCEDURE — 99285 EMERGENCY DEPT VISIT HI MDM: CPT | Mod: 25

## 2020-03-08 PROCEDURE — 82803 BLOOD GASES ANY COMBINATION: CPT

## 2020-03-08 PROCEDURE — 84295 ASSAY OF SERUM SODIUM: CPT

## 2020-03-08 PROCEDURE — 96374 THER/PROPH/DIAG INJ IV PUSH: CPT

## 2020-03-08 PROCEDURE — 93005 ELECTROCARDIOGRAM TRACING: CPT

## 2020-03-08 PROCEDURE — 82962 GLUCOSE BLOOD TEST: CPT

## 2020-03-08 PROCEDURE — 81003 URINALYSIS AUTO W/O SCOPE: CPT

## 2020-03-08 PROCEDURE — 85014 HEMATOCRIT: CPT

## 2020-03-08 PROCEDURE — 84132 ASSAY OF SERUM POTASSIUM: CPT

## 2020-03-08 PROCEDURE — 99238 HOSP IP/OBS DSCHRG MGMT 30/<: CPT

## 2020-03-08 PROCEDURE — 96375 TX/PRO/DX INJ NEW DRUG ADDON: CPT

## 2020-03-08 PROCEDURE — 80053 COMPREHEN METABOLIC PANEL: CPT

## 2020-03-08 PROCEDURE — 80048 BASIC METABOLIC PNL TOTAL CA: CPT

## 2020-03-08 PROCEDURE — 82947 ASSAY GLUCOSE BLOOD QUANT: CPT

## 2020-03-08 PROCEDURE — 85027 COMPLETE CBC AUTOMATED: CPT

## 2020-03-08 RX ORDER — AMLODIPINE BESYLATE 2.5 MG/1
1 TABLET ORAL
Qty: 0 | Refills: 0 | DISCHARGE
Start: 2020-03-08

## 2020-03-08 RX ORDER — SPIRONOLACTONE 25 MG/1
1 TABLET, FILM COATED ORAL
Qty: 0 | Refills: 0 | DISCHARGE

## 2020-03-08 RX ORDER — AMLODIPINE BESYLATE 2.5 MG/1
1 TABLET ORAL
Qty: 30 | Refills: 0
Start: 2020-03-08 | End: 2020-04-06

## 2020-03-08 RX ADMIN — SODIUM ZIRCONIUM CYCLOSILICATE 10 GRAM(S): 10 POWDER, FOR SUSPENSION ORAL at 11:04

## 2020-03-08 RX ADMIN — Medication 100 MILLIGRAM(S): at 05:28

## 2020-03-08 RX ADMIN — AMLODIPINE BESYLATE 5 MILLIGRAM(S): 2.5 TABLET ORAL at 05:28

## 2020-03-08 NOTE — DISCHARGE NOTE PROVIDER - HOSPITAL COURSE
The patient is a 75-year-old man with PMH of HTN on spironolactone, HLD, chronic aortic insufficiency and osteoarthritis of both hips s/p right THR 10/8/16 who presented with generalized weakness x 2 weeks found to have elevated creatinine and potassium by PMD. Patient reports that because of stressor (wife sick) he had not been eating as much or drinking as much water as he normally did though he has continued to take his medications all the same.    Renal US without hydronephrosis.    Admitted with JENNY with hyperkalemia and metabolic acidosis likely secondary to recent decreased PO intake with continued diuretic and ACE inhibitor use.    Potassium was 6.7 - S/P Lasix in ER and Lokelma and S/P IVF now normalized. and sodium bicarb for acidosis. Held Aldactone and ACE-I for JENNY and continue to hold on discharge with follow up BMP to check kidney function in 3 days The patient is a 75-year-old man with PMH of HTN on spironolactone, HLD, chronic aortic insufficiency and osteoarthritis of both hips s/p right THR 10/8/16 who presented with generalized weakness x 2 weeks found to have elevated creatinine and potassium by PMD. Patient reports that because of stressor (wife sick) he had not been eating as much or drinking as much water as he normally did though he has continued to take his medications all the same.    Renal US without hydronephrosis.    Admitted with JENNY creatinine of 2.71 with hyperkalemia of 6.7 and metabolic acidosis with serum CO2 of 16 likely secondary to recent decreased PO intake with continued diuretic and ACE inhibitor use.    Potassium was 6.7 - S/P Lasix in ER and Lokelma and S/P IVF now normalized. and sodium bicarb for acidosis - normalized. Held Aldactone and ACE-I for JENNY and continue to hold on discharge. Creatinine 1.7 on discharge. Medically cleared for discharge with follow up BMP to check kidney function in 3 days

## 2020-03-08 NOTE — DISCHARGE NOTE PROVIDER - NSDCCPCAREPLAN_GEN_ALL_CORE_FT
PRINCIPAL DISCHARGE DIAGNOSIS  Diagnosis: Acute hyperkalemia  Assessment and Plan of Treatment: Admitted with Acute kidney injury with hyperkalemia and metabolic acidosis likely secondary to recent decreased PO intake with continued diuretic and ACE inhibitor use.  Renal US without hydronephrosis.  Potassium was 6.7 - S/P Lasix in ER and Lokelma and S/P IVF now normalized.  Take low potassium diet  Follow up with PMD in 2 -3 days for blood work to check kidney function      SECONDARY DISCHARGE DIAGNOSES  Diagnosis: JENNY (acute kidney injury)  Assessment and Plan of Treatment: Admitted with JENNY with hyperkalemia and metabolic acidosis likely secondary to recent decreased PO intake with continued diuretic and ACE inhibitor use.  Renal US without hydronephrosis.  Potassium was 6.7 - S/P Lasix in ER and Lokelma and S/P IVF now normalized. and sodium bicarb for acidosis. Held Aldactone and ACE-I for JENNY and continue to hold on discharge with follow up BMP to check kidney function in 2 - 3 days by primary physician  Avoid taking (NSAIDs) - (ex: Ibuprofen, Advil, Celebrex, Naprosyn)  Avoid taking any nephrotoxic agents (can harm kidneys) - Intravenous contrast for diagnostic testing, combination cold medications.  Have all medications adjusted for your renal function by your Health Care Provider.  Blood pressure control is important.  Take all medication as prescribed.      Diagnosis: Acidosis  Assessment and Plan of Treatment: Resolved with improvement on kidney function    Diagnosis: Essential hypertension  Assessment and Plan of Treatment: Holding ACE-I and Spironolactone   Continue Norvasc and metoprolol with hold parameters and monitor BP.   Follow up with PMD in 2 -3 days for blood work to check kidney function and blood pressure check PRINCIPAL DISCHARGE DIAGNOSIS  Diagnosis: Acute hyperkalemia  Assessment and Plan of Treatment: Admitted with Acute kidney injury with creatinine of 2.71, hyperkalemia of 6.7 and metabolic acidosis with serum CO2 of 16 likely secondary to recent decreased PO intake with continued diuretic and ACE inhibitor use.  Potassium was 6.7 - S/P Lasix in ER and Lokelma and S/P IVF now normalized. and sodium bicarb for acidosis - normalized. Potassium on discharge 5.0 on discharge  Held Aldactone and ACE-I for JENNY and continue to hold on discharge. Creatinine 1.7 on discharge.  Renal US without hydronephrosis.  Potassium was 6.7 - S/P Lasix in ER and Lokelma and S/P IVF now normalized.  Take low potassium diet  Follow up with PMD in 2 -3 days for blood work to check kidney function      SECONDARY DISCHARGE DIAGNOSES  Diagnosis: JENNY (acute kidney injury)  Assessment and Plan of Treatment: Admitted with Acute kidney injury with creatinine of 2.71, hyperkalemia of 6.7 and metabolic acidosis with serum CO2 of 16 likely secondary to recent decreased PO intake with continued diuretic and ACE inhibitor use.  Potassium was 6.7 - S/P Lasix in ER and Lokelma and S/P IVF now normalized. and sodium bicarb for acidosis - normalized. Potassium on discharge 5.0 on discharge  Held Aldactone and ACE-I for JENNY and continue to hold on discharge Follow up BMP to check kidney function in 2 - 3 days by primary physician  Creatinine 1.7 on discharge.  Renal US without hydronephrosis.  Avoid taking (NSAIDs) - (ex: Ibuprofen, Advil, Celebrex, Naprosyn)  Avoid taking any nephrotoxic agents (can harm kidneys) - Intravenous contrast for diagnostic testing, combination cold medications.  Have all medications adjusted for your renal function by your Health Care Provider.  Blood pressure control is important.  Take all medication as prescribed.      Diagnosis: Acidosis  Assessment and Plan of Treatment: Resolved with improvement on kidney function  On discharge serum CO2 was 22  - normalized    Diagnosis: Essential hypertension  Assessment and Plan of Treatment: Holding ACE-I and Spironolactone   Continue Norvasc and metoprolol with hold parameters and monitor BP.   Follow up with PMD in 2 -3 days for blood work to check kidney function and blood pressure check

## 2020-03-08 NOTE — CHART NOTE - NSCHARTNOTEFT_GEN_A_CORE
Education Note:       Nutrition consult received for diet education prior to pt's discharge home today. Pt with hyperkalemia upon admission with instructions to follow a low-potassium diet at home. Reviewed low-potassium foods list, in addition to foods with moderate potassium and high potassium. Written materials provided for pt to take home with him. Pt receptive to diet education and highly motivated. RD is available for any further questions.    Peace Evans RD, CDN. Pager: [phone]

## 2020-03-08 NOTE — CHART NOTE - NSCHARTNOTEFT_GEN_A_CORE
Request from Dr. Cast to facilitate patient discharge. Medication reconciliation reviewed, revised, and resolved with Dr. Cast who had medically cleared patient for discharge with follow-up as advised. Please refer to discharge note for detailed hospital course. Patient is currently stable for discharge to home at this time.    Contact # [phone]

## 2020-03-08 NOTE — DISCHARGE NOTE PROVIDER - CARE PROVIDER_API CALL
Guerline Ferguson)  Internal Medicine  [address] [place] [address]  Phone: [phone]  Fax: [phone]  Follow Up Time: 1-3 days

## 2020-03-08 NOTE — DISCHARGE NOTE NURSING/CASE MANAGEMENT/SOCIAL WORK - PATIENT PORTAL LINK FT
You can access the FollowMyHealth Patient Portal offered by [facility] by registering at the following website: [email or web address]. By joining [facility]’s FollowMyHealth portal, you will also be able to view your health information using other applications (apps) compatible with our system.

## 2020-03-08 NOTE — DISCHARGE NOTE PROVIDER - NSDCMRMEDTOKEN_GEN_ALL_CORE_FT
amLODIPine 5 mg oral tablet: 1 tab(s) orally once a day  metoprolol tartrate 100 mg oral tablet: 1 tab(s) orally 2 times a day  pravastatin 80 mg oral tablet: 1 tab(s) orally once a day

## 2020-03-08 NOTE — DISCHARGE NOTE PROVIDER - NSDCFUADDINST_GEN_ALL_CORE_FT
Follow up with PMD in 2 -3 days for blood work to check kidney function and blood pressure check  Take all paper work to PMD

## 2022-10-20 NOTE — PATIENT PROFILE ADULT - NSTRANSFEREYEGLASSESPAIRS_GEN_A_NUR
Goal <140-150/90; avoid hypotension  Continue monthly BP checks, SBPs generally 120s-140s with intermittent outliers in 160s  Continue amlodipine, lisinopril, verapamil  Due for [facility] Dec 2022 1 pair

## 2025-05-06 NOTE — PHYSICAL THERAPY INITIAL EVALUATION ADULT - DIAGNOSIS, PT EVAL
Bed: 1  Expected date:   Expected time:   Means of arrival:   Comments:   Impaired functional mobility secondary to decreased strength, balance and endurance